# Patient Record
Sex: FEMALE | Race: WHITE | NOT HISPANIC OR LATINO | Employment: FULL TIME | URBAN - METROPOLITAN AREA
[De-identification: names, ages, dates, MRNs, and addresses within clinical notes are randomized per-mention and may not be internally consistent; named-entity substitution may affect disease eponyms.]

---

## 2017-05-02 ENCOUNTER — TRANSFERRED RECORDS (OUTPATIENT)
Dept: HEALTH INFORMATION MANAGEMENT | Facility: CLINIC | Age: 57
End: 2017-05-02

## 2017-06-09 ENCOUNTER — TRANSFERRED RECORDS (OUTPATIENT)
Dept: HEALTH INFORMATION MANAGEMENT | Facility: CLINIC | Age: 57
End: 2017-06-09

## 2017-08-17 ENCOUNTER — TRANSFERRED RECORDS (OUTPATIENT)
Dept: HEALTH INFORMATION MANAGEMENT | Facility: CLINIC | Age: 57
End: 2017-08-17

## 2017-12-27 ENCOUNTER — TRANSFERRED RECORDS (OUTPATIENT)
Dept: HEALTH INFORMATION MANAGEMENT | Facility: CLINIC | Age: 57
End: 2017-12-27

## 2018-06-29 ENCOUNTER — TRANSFERRED RECORDS (OUTPATIENT)
Dept: HEALTH INFORMATION MANAGEMENT | Facility: CLINIC | Age: 58
End: 2018-06-29

## 2018-07-10 ENCOUNTER — TRANSFERRED RECORDS (OUTPATIENT)
Dept: HEALTH INFORMATION MANAGEMENT | Facility: CLINIC | Age: 58
End: 2018-07-10

## 2018-07-12 ENCOUNTER — TRANSFERRED RECORDS (OUTPATIENT)
Dept: HEALTH INFORMATION MANAGEMENT | Facility: CLINIC | Age: 58
End: 2018-07-12

## 2018-09-10 ENCOUNTER — TRANSFERRED RECORDS (OUTPATIENT)
Dept: HEALTH INFORMATION MANAGEMENT | Facility: CLINIC | Age: 58
End: 2018-09-10

## 2018-10-22 ENCOUNTER — TRANSFERRED RECORDS (OUTPATIENT)
Dept: HEALTH INFORMATION MANAGEMENT | Facility: CLINIC | Age: 58
End: 2018-10-22

## 2019-08-20 ENCOUNTER — TELEPHONE (OUTPATIENT)
Dept: OPHTHALMOLOGY | Facility: CLINIC | Age: 59
End: 2019-08-20

## 2019-08-22 ENCOUNTER — TRANSFERRED RECORDS (OUTPATIENT)
Dept: HEALTH INFORMATION MANAGEMENT | Facility: CLINIC | Age: 59
End: 2019-08-22

## 2019-09-10 ENCOUNTER — TELEPHONE (OUTPATIENT)
Dept: OPHTHALMOLOGY | Facility: CLINIC | Age: 59
End: 2019-09-10

## 2019-09-10 NOTE — TELEPHONE ENCOUNTER
Called and LM on voicemail regarding bumped 9/17 appt with Candelario.   Gave Glaucoma desk call back information to reschedule.  Gloria Blair COA 11:09 AM September 10, 2019

## 2019-09-18 ENCOUNTER — OFFICE VISIT (OUTPATIENT)
Dept: FAMILY MEDICINE | Facility: CLINIC | Age: 59
End: 2019-09-18
Payer: COMMERCIAL

## 2019-09-18 VITALS
RESPIRATION RATE: 16 BRPM | WEIGHT: 152.1 LBS | DIASTOLIC BLOOD PRESSURE: 78 MMHG | HEART RATE: 79 BPM | OXYGEN SATURATION: 99 % | SYSTOLIC BLOOD PRESSURE: 118 MMHG

## 2019-09-18 DIAGNOSIS — Z12.31 VISIT FOR SCREENING MAMMOGRAM: ICD-10-CM

## 2019-09-18 DIAGNOSIS — Z78.0 POSTMENOPAUSAL STATUS: ICD-10-CM

## 2019-09-18 DIAGNOSIS — M85.859 OSTEOPENIA OF NECK OF FEMUR, UNSPECIFIED LATERALITY: ICD-10-CM

## 2019-09-18 DIAGNOSIS — Z83.79 FAMILY HISTORY OF BARRETT'S ESOPHAGUS: ICD-10-CM

## 2019-09-18 DIAGNOSIS — Z00.00 ROUTINE HISTORY AND PHYSICAL EXAMINATION OF ADULT: ICD-10-CM

## 2019-09-18 DIAGNOSIS — Z00.00 ENCOUNTER FOR MEDICAL EXAMINATION TO ESTABLISH CARE: Primary | ICD-10-CM

## 2019-09-18 DIAGNOSIS — Z80.51 FAMILY HISTORY OF RENAL CANCER: ICD-10-CM

## 2019-09-18 DIAGNOSIS — N89.8 VAGINAL DRYNESS: ICD-10-CM

## 2019-09-18 LAB
ALBUMIN UR-MCNC: NEGATIVE MG/DL
AMORPH CRY #/AREA URNS HPF: ABNORMAL /HPF
APPEARANCE UR: ABNORMAL
BACTERIA #/AREA URNS HPF: ABNORMAL /HPF
BILIRUB UR QL STRIP: NEGATIVE
COLOR UR AUTO: YELLOW
GLUCOSE UR STRIP-MCNC: NEGATIVE MG/DL
HGB UR QL STRIP: NEGATIVE
KETONES UR STRIP-MCNC: NEGATIVE MG/DL
LEUKOCYTE ESTERASE UR QL STRIP: NEGATIVE
NITRATE UR QL: NEGATIVE
PH UR STRIP: 7 PH (ref 5–7)
RBC #/AREA URNS AUTO: 1 /HPF (ref 0–2)
SOURCE: ABNORMAL
SP GR UR STRIP: 1.01 (ref 1–1.03)
UROBILINOGEN UR STRIP-MCNC: 0 MG/DL (ref 0–2)
WBC #/AREA URNS AUTO: 0 /HPF (ref 0–5)

## 2019-09-18 RX ORDER — MULTIPLE VITAMINS W/ MINERALS TAB 9MG-400MCG
1 TAB ORAL DAILY
COMMUNITY

## 2019-09-18 SDOH — HEALTH STABILITY: MENTAL HEALTH: HOW MANY STANDARD DRINKS CONTAINING ALCOHOL DO YOU HAVE ON A TYPICAL DAY?: 3 OR 4

## 2019-09-18 ASSESSMENT — PAIN SCALES - GENERAL: PAINLEVEL: NO PAIN (0)

## 2019-09-18 NOTE — PROGRESS NOTES
[facility]  Primary Care Center   Jorge Mercado MD  09/18/2019      Chief Complaint:   No chief complaint on file.       History of Present Illness:   Angelita Valentine is a 59 year old female with a history of a sebaceous cyst and lipoma of skin who presents for {eval/fu:951535} of ***.    Other concerns discussed:  ***     Review of Systems:   Pertinent items are noted in HPI or as in patient entered ROS below, remainder of complete ROS is negative.     Active Medications:     Current Outpatient Medications:      ASPIRIN ADULT LOW STRENGTH PO, , Disp: , Rfl:      cetirizine (ZYRTEC) 10 MG tablet, Take 10 mg by mouth daily, Disp: , Rfl:      desonide (DESOWEN) 0.05 % cream, Apply topically 2 times daily, Disp: , Rfl:       Allergies:   Penicillins and Nsaids      Past Medical History:  Lipoma of skin and subcutaneous tissue  Sebaceous cyst  Past Medical History:   Diagnosis Date     Lipoma of other skin and subcutaneous tissue      Sebaceous cyst      There is no problem list on file for this patient.       Past Surgical History:  Appendectomy  Breast biopsy  Excision nevi of neck and abdomen  Removal and shave biopsy of cyst on back  Colonoscopy  GYN surgery   Past Surgical History:   Procedure Laterality Date     APPENDECTOMY       BREAST SURGERY  biopsy     C NONSPECIFIC PROCEDURE  08-    excision nevi of neck & abdomen     C NONSPECIFIC PROCEDURE  06-    removal & shave biopsy of cyst on back      COLONOSCOPY N/A 4/12/2016    Procedure: COLONOSCOPY;  Surgeon: Gee Connelly MD;  Location:  GI     GYN SURGERY  ovarian cyst       Family History:   No family history on file.      Social History:  This patient was accompanied by: ***  Smoking status: never  Smokeless tobacco: never  Alcohol use: yes, couple times a week  Drug use: ***  Social History     Tobacco Use     Smoking status: Never Smoker   Substance Use Topics     Alcohol use: Yes     Comment: couple a week     Drug use: Not on  file        Physical Exam:   There were no vitals taken for this visit.   BMI= There is no height or weight on file to calculate BMI.    GENERAL APPEARANCE: healthy, alert and no distress  EYES: Eyes grossly normal to inspection, PERRL and conjunctivae and sclerae normal  HENT: ear canals and TM's normal, nose and mouth without ulcers or lesions, oropharynx clear and oral mucous membranes moist  NECK: no adenopathy, no asymmetry, masses, or scars and thyroid normal to palpation  RESP: lungs clear to auscultation - no rales, rhonchi or wheezes  CV: regular rate and rhythm, normal S1 S2, no S3 or S4, no murmur, click or rub, no peripheral edema and peripheral pulses strong  ABDOMEN: soft, nontender, no hepatosplenomegaly, no masses and bowel sounds normal  MS: no musculoskeletal defects are noted and gait is age appropriate without ataxia  SKIN: no suspicious lesions or rashes  NEURO: Normal strength and tone, sensory exam grossly normal, mentation intact and speech normal  PSYCH: mentation appears normal and affect normal/bright      Assessment and Plan:  Angelita Valentine is a 59 year old female with a history of a sebaceous cyst and lipoma of skin who presents for  There are no diagnoses linked to this encounter.     Follow-up: No follow-ups on file.         Scribe Disclosure:  IRosa, am serving as a scribe to document services personally performed by Jorge Mercado MD at this visit, based upon the provider's statements to me. All documentation has been reviewed by the aforementioned provider prior to being entered into the official medical record.    Scribe Preparation Attestation:  Rosa KATZ, a scribe, prepared the chart for today's encounter. ***     Portions of this medical record were completed by a scribe. UPON MY REVIEW AND AUTHENTICATION BY ELECTRONIC SIGNATURE, this confirms (a) I performed the applicable clinical services, and (b) the record is accurate.

## 2019-09-18 NOTE — PATIENT INSTRUCTIONS
Primary Care Center Medication Refill Request Information:  * Please contact your pharmacy regarding ANY request for medication refills.  ** [facility] Prescription Fax = [phone]  * Please allow 3 business days for routine medication refills.  * Please allow 5 business days for controlled substance medication refills.     Primary Care Center Test Result notification information:  *You will be notified with in 7-10 days of your appointment day regarding the results of your test.  If you are on MyChart you will be notified as soon as the provider has reviewed the results and signed off on them.

## 2019-09-18 NOTE — PROGRESS NOTES
[facility]  Primary Care Center   Jorge Mercado MD  2019      Chief Complaint:   Establish Care   Physical    History of Present Illness:   Angelita Valentine is a 59 year old female with a history of a sebaceous cyst and lipoma of skin who presents to establish care as her health coverage has recently changed and for a physical.  She has a family Histroy of several cancers and  Recently learned diagnosis of draper's esophagus, esophageal cancer in her father who .    Family history:   Esophageal cancer: Her father recently passed away of esophageal cancer along with her paternal aunt. He had an endoscopy done that showed Draper's esophagus as his went undiagnosed for years, however the doctor recommended that all of the children have an evaluation for draper's esophagus as her father had esophageal cancer and did not have reflux symptoms. Her brother had an endoscopy that did show reflux, however he did not show any signs of Draper's esophagus. Currently, she denies any symptoms of reflux.     Melanoma: She has a family history of Melanoma in her brother and father. She receives regular skin checks, however plans to look into see if she needs to change her dermatologist as well after her insurance change.    Vaginal Dryness:   Since menopause she has had vaginal dryness that is bothersome during intercourse. She still has her uterus and ovaries. Previously, she used an E-string ring to help and requires a refill. She has normal  PAP and HPV screening next due in . At a younger age she had endometriosis and some cysts removed.  She denies vaginal bleeding.    Tennis elbow:   She is experiencing Tennis elbow that she has just been icing. The last time she had this was was many years ago, and is wondering if she should be doing anything to help.     Bone density:    She describes that she takes a chocolate calcium supplement and tries to drink milk with her tea. Due for DEXA  ast last was competed  6/2017  previous had low bone density.    Health Care Maintenance:   Mammogram (females age 40 - 75): yearly or every 2 years - Up to date Last done on 7/12/2018  Pap (females age 21 - 65): every 3 years, every 5 years after age 35 if cotesting done - Up to date 12/13/2016 - normal NIL, HPV negative - Due 12/2021   Colon Cancer screening (age 50 - 75): yearly FIT or colonoscopy every 5 - 10 years - Up to date - Last done on 4/12/2016 and every 10 years recommended.   Dexa (females age ? 65, males age ? 70): every 2 years - Up to date - Last done in 6/2017 showed low done density  Lipid panel: every 5 years, yearly if risk factors - Up to date - 6/29/2018   Hepatitis C (adults born 1947 - 1965): once per lifetime - Up to date - 7/23/2015  Immunizations (Tetanus every 10 years, Influenza yearly, Pneumonia PPV-23 and PCV-13 age ? 65 or sooner if risk factors) - Recommended - Plans to obtain Shingles vaccine from [facility]  Immunization History   Administered Date(s) Administered     HEPA 03/15/2001, 04/08/2002     HepA-Adult 03/15/2001, 04/08/2002     Influenza (IIV3) PF 12/24/2012, 10/16/2013, 10/07/2014     Influenza (intradermal) 10/28/2015     Influenza Vaccine IM > 6 months Valent IIV4 10/01/2016     Influenza Vaccine, 3 YRS +, IM (QUADRIVALENT W/PRESERVATIVES) 10/28/2015     Poliovirus, inactivated (IPV) 03/15/2001     TDAP Vaccine (Adacel) 01/01/2008, 07/27/2018     Typhoid IM 03/15/2001     Typhus 03/15/2001      The following health maintenance issues were also reviewed and addressed as needed:  Lifestyle habits (including exercise, diet, weight)  Health risk behaviors (sexual history, alcohol, tobacco, drug use, partner violence)  Routine eye, dental, hearing, and skin exams  Advanced directives    Review of Systems:   Pertinent items are noted in HPI or as in patient entered ROS below, remainder of complete ROS is negative.     Active Medications:      cetirizine (ZYRTEC) 10 MG tablet, Take 10 mg by mouth  daily, Disp: , Rfl:      multivitamin w/minerals (MULTI-VITAMIN) tablet, Take 1 tablet by mouth daily, Disp: , Rfl:      desonide (DESOWEN) 0.05 % cream, Apply topically 2 times daily, Disp: , Rfl:       Allergies:   Penicillins; Acetaminophen; Ibuprofen; Latex; Naproxen; and Nsaids      Past Medical History:  Lipoma of skin and subcutaneous tissue  Sebaceous cyst  Seasonal allergies     Past Surgical History:  Appendectomy  Breast biopsy  Excision nevi of neck and abdomen  Removal and shave biopsy of cyst on back  Colonoscopy  GYN surgery     Family History:    Esophageal cancer - father (passed at 90 years old), paternal aunt   Congestive heart failure - mother (passed at 82 years old)  Gastroesophageal reflux - brother  Melanoma - brother, father   Kidney cancer - brother  Pancreatic cancer - paternal grandmother (passed at 57 years old)  Breast cancer - maternal aunt  Type 2 diabetes - father    Social History:  This patient presented alone.   Smoking status: not regularly   Smokeless tobacco: never  Alcohol use: yes, 3-4 times a week, wine  Drug use: no     Social History     Social History Narrative    Retired   to [employer] retired  1996 no children winter in [place]     Physical Exam:   /78 (BP Location: Right arm, Patient Position: Sitting, Cuff Size: Adult Regular)   Pulse 79   Resp 16   Wt 69 kg (152 lb 1.6 oz)   SpO2 99%   Breastfeeding? No    BMI= There is no height or weight on file to calculate BMI.    GENERAL APPEARANCE: healthy, alert and no distress  EYES: Eyes grossly normal to inspection, PERRL and conjunctivae and sclerae normal  HENT: ear canals and TM's normal, bilateral ears with cerumen, nose and mouth without ulcers or lesions, oropharynx clear and oral mucous membranes moist  NECK: no adenopathy, no asymmetry, masses, or scars and thyroid normal to palpation  RESP: lungs clear to auscultation - no rales, rhonchi or wheezes  CV: regular rate and  rhythm, normal S1 S2, no S3 or S4, no murmur, click or rub, no peripheral edema and peripheral pulses strong including dorsalis pedis and tibial  ABDOMEN: soft, nontender, no hepatosplenomegaly, no masses and bowel sounds normal  MS: no musculoskeletal defects are noted and gait is age appropriate without ataxia  SKIN: On back there are a few scattered nevi and seborrheic keratosis, none of which are concerning. On abdomen under right and left breast there is a few scattered seborrheic keratosis. Scar on umbilicus from laparoscopic procedure.   NEURO: Normal strength and tone, sensory exam grossly normal, mentation intact and speech normal  PSYCH: mentation appears normal and affect normal/bright   BREAST: normal without masses, tenderness or nipple discharge and no palpable axillary masses or adenopathy    Assessment and Plan:  Angelita Valentine is a 59 year old female with a history of a sebaceous cyst and lipoma of skin who presents to establish care and for a physical.   Family history of Sherwood's esophagus and several other cancers including melanoma, pancreatic cancers.  I discussed genetics referral to check with her insurance to determine if covered.She sees dermatology due to family HX of melanoma  Her father and paternal aunt have Sherwood's esophagus and she would like to have an endoscopy to check for this. She is not currently having any reflux symptoms.   - GASTROENTEROLOGY ADULT REF PROCEDURE ONLY  - GENETICS REFERRAL    Osteopenia of neck of femur, unspecified laterality  - Dexa hip/pelvis/spine*    Visit for screening mammogram  Due 7/2020, and a future order was placed.   - Mammogram, screening w av (3D)    Postmenopausal status  Vaginal dryness  We discussed that using an E-string ring may help with vaginal dryness, however there is a risk involved related to endometrial cancer therefore if vaginal bleeding needs to contact MD for further assessment. A prescription was placed, however she will  decide later if she would like to fill this. If she does decide to use this, she should watch for bleeding.  Due for pap in 2021  - estradiol (ESTRING) 2 MG vaginal ring  Dispense: 1 each; Refill: 4    Family history of renal cancer  Given that she has a family history of esophageal, pancreatic, kidney, and breast cancer she will consider having genetic consult to determine if genetic  testing  Should be completed.  - UA with Micro reflex to Culture  - GENETICS REFERRAL  My card provided.  She will try to get 1000 mg of calcium best through nutrition.  Follow-up: Return in about 1 year (around 9/18/2020).         Scribe Disclosure:  I, Rosa Park, am serving as a scribe to document services personally performed by Jorge Mercado MD at this visit, based upon the provider's statements to me. All documentation has been reviewed by the aforementioned provider prior to being entered into the official medical record.     Portions of this medical record were completed by a scribe. UPON MY REVIEW AND AUTHENTICATION BY ELECTRONIC SIGNATURE, this confirms (a) I performed the applicable clinical services, and (b) the record is accurate.   Jorge Mercado MD

## 2019-09-23 ENCOUNTER — ANCILLARY PROCEDURE (OUTPATIENT)
Dept: BONE DENSITY | Facility: CLINIC | Age: 59
End: 2019-09-23
Attending: FAMILY MEDICINE
Payer: COMMERCIAL

## 2019-09-23 DIAGNOSIS — M85.859 OSTEOPENIA OF NECK OF FEMUR, UNSPECIFIED LATERALITY: ICD-10-CM

## 2019-09-24 ENCOUNTER — OFFICE VISIT (OUTPATIENT)
Dept: OPHTHALMOLOGY | Facility: CLINIC | Age: 59
End: 2019-09-24
Attending: OPHTHALMOLOGY
Payer: COMMERCIAL

## 2019-09-24 DIAGNOSIS — H40.003 GLAUCOMA SUSPECT OF BOTH EYES: Primary | ICD-10-CM

## 2019-09-24 DIAGNOSIS — H40.9 GLAUCOMA: ICD-10-CM

## 2019-09-24 DIAGNOSIS — H25.13 NUCLEAR SENILE CATARACT OF BOTH EYES: ICD-10-CM

## 2019-09-24 PROCEDURE — 76514 ECHO EXAM OF EYE THICKNESS: CPT | Mod: ZF | Performed by: OPHTHALMOLOGY

## 2019-09-24 PROCEDURE — 92133 CPTRZD OPH DX IMG PST SGM ON: CPT | Mod: ZF | Performed by: OPHTHALMOLOGY

## 2019-09-24 PROCEDURE — 92020 GONIOSCOPY: CPT | Mod: ZF | Performed by: OPHTHALMOLOGY

## 2019-09-24 PROCEDURE — G0463 HOSPITAL OUTPT CLINIC VISIT: HCPCS | Mod: ZF

## 2019-09-24 PROCEDURE — 92083 EXTENDED VISUAL FIELD XM: CPT | Mod: ZF | Performed by: OPHTHALMOLOGY

## 2019-09-24 ASSESSMENT — VISUAL ACUITY
OD_CC+: -2
OD_CC: 20/30
OD_PH_CC: 20/25
OS_CC+: -3
METHOD: SNELLEN - LINEAR
CORRECTION_TYPE: GLASSES
OS_CC: 20/20

## 2019-09-24 ASSESSMENT — GONIOSCOPY
OS_NASAL: 3-4
OD_INFERIOR: 3-4
OS_INFERIOR: 3-4
OD_SUPERIOR: 3-4
ADDITIONAL_COMMENTS: 2-3+ TMP OU
OD_NASAL: 3-4
OS_SUPERIOR: 3-4
OS_TEMPORAL: 3-4
OD_TEMPORAL: 3-4

## 2019-09-24 ASSESSMENT — PACHYMETRY
OD_CT(UM): 536
OS_CT(UM): 533

## 2019-09-24 ASSESSMENT — REFRACTION_WEARINGRX
OS_CYLINDER: +4.00
OD_SPHERE: -9.50
OS_AXIS: 089
OD_CYLINDER: +3.75
OD_AXIS: 109
OS_ADD: +2.50
OD_ADD: +2.50
OS_SPHERE: -10.75
SPECS_TYPE: PAL

## 2019-09-24 ASSESSMENT — SLIT LAMP EXAM - LIDS
COMMENTS: NORMAL
COMMENTS: NORMAL

## 2019-09-24 ASSESSMENT — CUP TO DISC RATIO
OD_RATIO: 0.4
OS_RATIO: 0.5

## 2019-09-24 ASSESSMENT — TONOMETRY
IOP_METHOD: APPLANATION
OS_IOP_MMHG: 15
OD_IOP_MMHG: 15

## 2019-09-24 ASSESSMENT — CONF VISUAL FIELD
OS_NORMAL: 1
METHOD: COUNTING FINGERS
OD_NORMAL: 1

## 2019-09-24 NOTE — PROGRESS NOTES
1)Glc Suspect -- K pachy:  536/533  Tmax:     HVF: Fluct      CDR:0.4/0.5     HRT/OCT: Mild RNFl thinning       FHX of Glc: No     Gonio: open      Intolerant to:      Asthma/COPD: No  Steroid Use: No     Kidney Stones: No    Sulfa Allergy: No     IOP targets:  2)NS OU -- following with Dr. Reid  3)High Myopia -- retired    4)H/O RT OS per patient   5)CAROLE -- travels to [place] in winter     Will obtain old notes and visual field tests.  Patient will return to clinic in 8-12 months with visual field test, dilated eye exam, and disc photos.  If stable will consider following IOPs and ONH.       Attending Physician Attestation:  Complete documentation of historical and exam elements from today's encounter can be found in the full encounter summary report (not reduplicated in this progress note). I personally obtained the chief complaint(s) and history of present illness.  I confirmed and edited as necessary the review of systems, past medical/surgical history, family history, social history, and examination findings as documented by others; and I examined the patient myself. I personally reviewed the relevant tests, images, and reports as documented above. I formulated and edited as necessary the assessment and plan and discussed the findings and management plan with the patient and family.  - Tory Palomino MD

## 2019-09-24 NOTE — PATIENT INSTRUCTIONS
Patient will return to clinic in 8-12 months with visual field test, dilated eye exam, and disc photos.     Patient will start warm compresses 2x/day, increase dietary flax seed/fish oil dietary supplementation, and start artificial tears 4-6x/day as needed for burning, tearing, mattering, foreign body sensation, blurred vision, etc.  Artifical tear drops are available over the counter. Below are a list of the some of the drops available:  Refresh   Systane  Theratears  Genteal  Blink  Optive      Please avoid Visine (unless Visine Puretears), Murine or Cleareyes or Puralube tear drops.    These have ingredients that take the red out and but actually increase dryness and redness of the eyes over time

## 2019-09-24 NOTE — NURSING NOTE
Chief Complaints and History of Present Illnesses   Patient presents with     Glaucoma     Chief Complaint(s) and History of Present Illness(es)     Glaucoma     Laterality: both eyes    Quality: difficult to focus and blurred    Treatment side effects: none              Comments     Pt notes her vision is blurry BE and has trouble seeing street signs, along with poor night vision.   Complains of occasional glare at night time and tearing BE.  Denies any flashes, floaters, pain, pressure, irritation, and discharge.    Kate Cabrera OT 12:49 PM September 24, 2019

## 2019-09-26 ENCOUNTER — MYC MEDICAL ADVICE (OUTPATIENT)
Dept: FAMILY MEDICINE | Facility: CLINIC | Age: 59
End: 2019-09-26

## 2019-09-26 DIAGNOSIS — Z78.0 POSTMENOPAUSAL STATUS: Primary | ICD-10-CM

## 2019-09-27 ENCOUNTER — TELEPHONE (OUTPATIENT)
Dept: FAMILY MEDICINE | Facility: CLINIC | Age: 59
End: 2019-09-27

## 2019-09-27 NOTE — TELEPHONE ENCOUNTER
Prior Authorization Retail Medication Request    Medication/Dose: Estring 2 mg Vaginal Ring  ICD code (if different than what is on RX):  Z78.0  Previously Tried and Failed:  Unknown  Rationale:  None    Insurance Name:  [facility]  Insurance ID:  [id number]

## 2019-09-28 ENCOUNTER — HEALTH MAINTENANCE LETTER (OUTPATIENT)
Age: 59
End: 2019-09-28

## 2019-09-30 ENCOUNTER — MYC MEDICAL ADVICE (OUTPATIENT)
Dept: FAMILY MEDICINE | Facility: CLINIC | Age: 59
End: 2019-09-30

## 2019-09-30 NOTE — TELEPHONE ENCOUNTER
Please call her, need to know if she would like alternative RX. I tried to call not able to leave a message on denial of insurance to cover Estring.  Jorge Mercado MD

## 2019-09-30 NOTE — TELEPHONE ENCOUNTER
September 30, 2019  Please check on the status of prior authorization for estring.  Best wishes,  Jorge Mercado MD

## 2019-09-30 NOTE — TELEPHONE ENCOUNTER
PRIOR AUTHORIZATION DENIED    Medication: Estring 2 mg Vaginal Ring-PA DENIED     Denial Date: 9/30/2019    Denial Rational: Criteria Not Met. Insurance states that patient must tried/failed 2 formulary alternatives: Estradiol vaginal tablets, and Estradiol Patches.         Appeal Information: If provider would like to appeal please provide a letter of medical necessity stating why formulary alternatives would not be clinically appropriate for patient and route back to the PA team.

## 2019-09-30 NOTE — TELEPHONE ENCOUNTER
Central Prior Authorization Team   [phone]    PA Initiation    Medication: Estring 2 mg Vaginal Ring  Insurance Company: [employer] - Phone [phone] Fax [phone]  Pharmacy Filling the Rx: [id number] [address] [facility] [address]  Filling Pharmacy Phone: [phone]  Filling Pharmacy Fax: [phone]  Start Date: 9/30/2019

## 2019-10-01 NOTE — TELEPHONE ENCOUNTER
I can be reached at [phone] until noon on Tuesday. Thanks.   Angelita     October 1, 2019 8:55, 10:20 10:45 am I tried to call not able to leave a message on voicemail.   She will require an appointment to discuss further.  Jorge Mercado MD

## 2019-10-01 NOTE — TELEPHONE ENCOUNTER
Called the pt, she wanted to know more about the risk/benifits and side effects of the other medication she has to try before approval.  She said Wednesday would be a good time, routed to MD Esperanza Acosta Paramedic on 10/1/2019 at 8:47 AM

## 2019-10-01 NOTE — TELEPHONE ENCOUNTER
I tried to call her during the time frame she was available today and not able to connect with her or leave a message therefore she needs a follow-up appt to discuss medications.  Jorge Mercado MD

## 2019-10-01 NOTE — TELEPHONE ENCOUNTER
Sent a message to schedule an apt to discuss. Esperanza Acosta Paramedic on 10/1/2019 at 12:34 PM

## 2019-12-02 ENCOUNTER — MYC MEDICAL ADVICE (OUTPATIENT)
Dept: FAMILY MEDICINE | Facility: CLINIC | Age: 59
End: 2019-12-02

## 2019-12-02 NOTE — TELEPHONE ENCOUNTER
Asked where Gee Connelly worked so I can fax the referral . Esperanza Acsota Paramedic on 12/2/2019 at 4:06 PM     Faxed endoscopy to [facility]. Esperanza Acosta Paramedic on 12/2/2019 at 4:12 PM

## 2019-12-03 ENCOUNTER — MYC MEDICAL ADVICE (OUTPATIENT)
Dept: FAMILY MEDICINE | Facility: CLINIC | Age: 59
End: 2019-12-03

## 2020-01-02 ENCOUNTER — OFFICE VISIT (OUTPATIENT)
Dept: FAMILY MEDICINE | Facility: CLINIC | Age: 60
End: 2020-01-02
Payer: COMMERCIAL

## 2020-01-02 VITALS
DIASTOLIC BLOOD PRESSURE: 79 MMHG | HEIGHT: 68 IN | SYSTOLIC BLOOD PRESSURE: 119 MMHG | OXYGEN SATURATION: 98 % | BODY MASS INDEX: 23.57 KG/M2 | WEIGHT: 155.5 LBS | TEMPERATURE: 97.5 F | RESPIRATION RATE: 16 BRPM | HEART RATE: 67 BPM

## 2020-01-02 DIAGNOSIS — M77.12 LEFT TENNIS ELBOW: ICD-10-CM

## 2020-01-02 DIAGNOSIS — Z78.0 POSTMENOPAUSAL STATUS: ICD-10-CM

## 2020-01-02 DIAGNOSIS — Z71.84 TRAVEL ADVICE ENCOUNTER: ICD-10-CM

## 2020-01-02 DIAGNOSIS — N89.8 VAGINAL DRYNESS: Primary | ICD-10-CM

## 2020-01-02 DIAGNOSIS — H61.23 CERUMINOSIS, BILATERAL: ICD-10-CM

## 2020-01-02 DIAGNOSIS — Z23 NEED FOR INFLUENZA VACCINATION: ICD-10-CM

## 2020-01-02 LAB
HBV SURFACE AB SERPL IA-ACNC: 0.61 M[IU]/ML
HBV SURFACE AG SERPL QL IA: NONREACTIVE
MEV IGG SER QL IA: 1.4 AI (ref 0–0.8)
MUV IGG SER QL IA: 2.9 AI (ref 0–0.8)
RUBV IGG SERPL IA-ACNC: 64 IU/ML

## 2020-01-02 ASSESSMENT — PAIN SCALES - GENERAL: PAINLEVEL: NO PAIN (0)

## 2020-01-02 ASSESSMENT — MIFFLIN-ST. JEOR: SCORE: 1318.84

## 2020-01-02 NOTE — NURSING NOTE
Angelita RACHEL Serge comes into clinic today at the request of [facility], Ordering Provider for an immunization/s.    I gave the Flu and Typhoid  immunization/s while the patient was in clinic. They received an informational sheet and I explained the common side effects of the injection. The patient tolerated the procedure well and had no complications while here in clinic.     This service provided today was under the supervising provider of the day Santilli, who was available if needed.    Darrick Gaitan CMA, EMT at 4:04 PM on 2020.    Angelita Valentine      1.  Has the patient received the information for the influenza vaccine? YES    2.  Does the patient have any of the following contraindications?     Allergy to eggs? No     Allergy to a component of the influenza vaccine? No     Serious reaction to previous influenza vaccines? No     Paralyzed by Guillain-Troy syndrome? No     Currently sick today? No         3.  Verified patient name and  prior to injection. Yes  4.  The patient was instructed to wait 15 minutes before leaving the building in the event of an allergic reaction: YES        Vaccination given by Darrick Gaitan, EMT.      Recorded by Darrick Gaitan CMA Ellen RACHEL Valenitne comes into clinic today at the request of [facility], Ordering Provider for an immunization/s.    I gave the Flu and Typhoid immunization/s while the patient was in clinic. They received an informational sheet and I explained the common side effects of the injection. The patient tolerated the procedure well and had no complications while here in clinic.     This service provided today was under the supervising provider of the day Santilli, who was available if needed.    Darrick Gaitan CMA, EMT at 4:04 PM on 2020.    EAR WASH    An ear wash was performed by myself while the patient was in clinic.   The patient tolerated this procedure well and had no complications.    A large amount of impacted cerumen was removed  from the left ear and a small amount of cerumen was removed from the right ear.    Tympanic membrane was clearly visible.     Darrick Gaitan, DOROTHEA at 4:04 PM on 1/2/2020.

## 2020-01-02 NOTE — PATIENT INSTRUCTIONS
[facility] Medication Refill Request Information:  * Please contact your pharmacy regarding ANY request for medication refills.  ** [facility] Prescription Fax = [phone]  * Please allow 3 business days for routine medication refills.  * Please allow 5 business days for controlled substance medication refills.     [facility] Test Result notification information:  *You will be notified with in 7-10 days of your appointment day regarding the results of your test.  If you are on MyChart you will be notified as soon as the provider has reviewed the results and signed off on them.    [facility]: [phone]

## 2020-01-02 NOTE — PROGRESS NOTES
[facility]  Primary Care Center   Jorge Mercado MD  01/02/2020      Chief Complaint:   Recheck Medication  Left elbow pain  Vaginal dryness  Family HX cancer  Travel to [facility]        History of Present Illness:   Angelita Valentine is a 59 year old female with a history of seasonal allergies, osteopenia, and glaucoma suspect who presents for evaluation of elbow pain and follow up of vaginal dryness.    Elbow pain: She has had pain in her left elbow for about 4 months with elbow flexion that prevents her from doing bicep curls or lifting anything heavy. The pain is 4/10 in severity only occurs with motion. She denies limitation of motion. She plays tennis and pickleball and is left handed. The pain occurs intermittently during those activities. She wears a tennis elbow brace when she plays. She is still playing a couple of times per week. She has not tried taking Aspirin or icing.     Vaginal dryness: She has vaginal dryness. She used to use an Estradiol 2 mg vaginal ring, but her insurance no longer pays for it. She is interested in other treatments. She still has her uterus no vaginal bleeding.    Family history:  At last visit we reviewed her family HX of several cancers including melanoma, esophageal , pancreatic cancer, renal cancer and I discussed consideration of genetics referral to determine if genetic testing is recommended. She will consider genetic counseling after she reviews with her siblings for her family history of pancreatic, kidney, and esophageal cancer and melanoma. She had a normal colonoscopy in 2016. She has an endoscopy scheduled that she will complete later this spring as she has a family HX of manuel's esophagus. She has no current GI concerns.  She has yet to complete mammogram ordered last visit but will complete prior to this summer.     Other concerns discussed:  1. She takes calcium supplements daily and exercises to manage osteopenia.   2. She will be traveling to [facility] in March.  "  3. Her ears are\" itchy and crunchy\"  wondering if cerumen.     Review of Systems:   Pertinent items are noted in HPI or as in patient entered ROS below, remainder of complete ROS is negative.     Active Medications:     Current Outpatient Medications:      cetirizine (ZYRTEC) 10 MG tablet, Take 10 mg by mouth daily, Disp: , Rfl:      [START ON 1/3/2020] conjugated estrogens (PREMARIN) 0.625 MG/GM vaginal cream, Place 0.5 g vaginally three times a week Then taper to once or twice weekly as needed, Disp: 30 g, Rfl: 6     desonide (DESOWEN) 0.05 % cream, Apply topically 2 times daily, Disp: , Rfl:      estradiol (ESTRING) 2 MG vaginal ring, Place 1 each vaginally every 3 months, Disp: 1 each, Rfl: 4     multivitamin w/minerals (MULTI-VITAMIN) tablet, Take 1 tablet by mouth daily, Disp: , Rfl:      ASPIRIN ADULT LOW STRENGTH PO, , Disp: , Rfl:       Allergies:   Penicillins; Acetaminophen; Ibuprofen; Latex; Naproxen; and Nsaids      Past Medical History:  Lipoma of other skin and subcutaneous tissue  Seasonal allergies  Sebaceous cyst  Osteopenia of neck of femur  Glaucoma suspect of both eyes  Nuclear senile cataract of both eyes      Past Surgical History:  Appendectomy  Breast biopsy  Excision of nevi of neck and abdomen: 2000  Removal and shave biopsy of back cyst: 2000  Colonoscopy: 4/2016  Gyn surgery for ovarian cyst     Family History:   Esophageal cancer: father, paternal aunt  Sherwood's esophagus: father  Melanoma: father, brother  Type 2 diabetes mellitus: father  Heart failure: mother  GERD: brother  Kidney cancer: brother  Pancreatic cancer: paternal grandmother       Social History:   Smoking status: never smoker  Alcohol use: 4 glasses of wine per week     Physical Exam:   /79 (BP Location: Right arm, Patient Position: Sitting, Cuff Size: Adult Regular)   Pulse 67   Temp 97.5  F (36.4  C) (Oral)   Resp 16   Ht 1.727 m (5' 8\")   Wt 70.5 kg (155 lb 8 oz)   SpO2 98%   BMI 23.64 kg/m    "   GENERAL APPEARANCE: healthy, alert and no distress. Detail oriented.   EYES: Eyes grossly normal to inspection, PERRL and conjunctivae and sclerae normal  HENT: ear canals obstructed by wax and visible TM's normal, mouth without ulcers or lesions, oropharynx clear and oral mucous membranes moist  NECK: no adenopathy, no asymmetry, masses, or scars and thyroid normal to palpation  RESP: lungs clear to auscultation - no rales, rhonchi or wheezes  CV: regular rate and rhythm, normal S1 S2, no S3 or S4, no murmur, click or rub  MS: left lateral and medical epicondylitis with pain with palpation, no swelling or deformity full range of motion of upper extremities, no musculoskeletal defects are noted and gait is age appropriate without ataxia  NEURO: Normal strength and tone, mentation intact and speech normal  PSYCH: mentation appears normal and affect normal/bright, detail-oriented     Assessment and Plan:  Angelita Valentine is a 59 year old female with a history of seasonal allergies, osteopenia, and glaucoma suspect who presents for left evaluation of elbow pain and follow up of vaginal dryness, family HX of cancer ( she will contact me if she would like genetic referral) and travel recommendations.    Vaginal dryness, Postmenopausal status  She used to use Estradiol ring for vaginal dryness, but her insurance no longer covers it. Gave Premarin cream as an alternative. Discussed that she needs to contact if she develops vaginal bleeding, reviewed risk for endometrial cancer. She can also use lubricants during intercourse. If the vaginal dryness and other symptoms such as hot flashes are interfering with her quality of life, can consider Estrogen pills along with Progesterone. Discussed the risks of cancer involved.   - conjugated estrogens (PREMARIN) 0.625 MG/GM vaginal cream  Dispense: 30 g; Refill: 6  Start Three times weekly reduce to once or twice weekly if able for symptom control  Need for influenza vaccination  -  HC FLU VAC PRESRV FREE QUAD SPLIT VIR > 6 MONTHS IM    Travel advice encounter  I reviewed CDC for travel and reviewed information with her.  She is leaving today to spend the rest of the winter in [place], and will be traveling to [facility] in March. She is due for a typhoid vaccine. Will check labs for immunity to measles and hepatitis B. If she is not immune, she can have her vaccinations done in [place].   - TYPHOID VACCINE, IM  - Hepatitis B Surface Antibody  - Hepatitis B surface antigen  - Rubeola Antibody IgG  - Rubella Antibody IgG Quantitative  - Mumps Immune Status, IgG    Left tennis elbow  She will try taking Aspirin right before exercise with food to help prevent elbow pain and continue to use the tennis elbow brace. She can use ice if it feels inflamed after exercise. Recommended assessing the ergonomics of her swing. If it continues to hurt, she will reduce activity.     Ceruminosis, bilateral  Her ear canals are obstructed by wax today. Removed in clinic today.   - REMOVE IMPACTED CERUMEN    She will complete mammogram and Upper endoscopy as soon as she returns from travel.  Follow-up: Annual visit       Scribe Disclosure:  Sharon KATZ, am serving as a scribe to document services personally performed by Jorge Mercado MD at this visit, based upon the provider's statements to me. All documentation has been reviewed by the aforementioned provider prior to being entered into the official medical record.    Scribe Preparation Attestation:  Sharon KATZ, a scribe, prepared the chart for today's encounter.      Portions of this medical record were completed by a scribe. UPON MY REVIEW AND AUTHENTICATION BY ELECTRONIC SIGNATURE, this confirms (a) I performed the applicable clinical services, and (b) the record is accurate.   Jorge Mercado MD

## 2020-01-02 NOTE — NURSING NOTE
Chief Complaint   Patient presents with     Recheck Medication     Pt comes in for a routine follow up. Would like to discuss vaccinations for upcoming travel.         Alfredo Lin, EMT on 1/2/2020 at 8:03 AM

## 2020-01-03 ENCOUNTER — TELEPHONE (OUTPATIENT)
Dept: INTERNAL MEDICINE | Facility: CLINIC | Age: 60
End: 2020-01-03

## 2020-01-03 DIAGNOSIS — Z78.0 POSTMENOPAUSAL STATUS: ICD-10-CM

## 2020-01-03 DIAGNOSIS — N89.8 VAGINAL DRYNESS: Primary | ICD-10-CM

## 2020-01-03 RX ORDER — ESTRADIOL 0.1 MG/G
2 CREAM VAGINAL
Qty: 42.5 G | Refills: 6 | Status: SHIPPED | OUTPATIENT
Start: 2020-01-03 | End: 2020-01-22

## 2020-01-03 NOTE — TELEPHONE ENCOUNTER
Angelita chart reviewed today for refill request. Premarin not covered.    Diagnoses and all orders for this visit:    Vaginal dryness  -     estradiol (ESTRACE) 0.1 MG/GM vaginal cream; Place 2 g vaginally three times a week Then may lower to once or twice weekly as needed    Postmenopausal status  -     estradiol (ESTRACE) 0.1 MG/GM vaginal cream; Place 2 g vaginally three times a week Then may lower to once or twice weekly as needed    Jorge Mercado MD

## 2020-01-22 ENCOUNTER — MYC MEDICAL ADVICE (OUTPATIENT)
Dept: FAMILY MEDICINE | Facility: CLINIC | Age: 60
End: 2020-01-22

## 2020-01-22 DIAGNOSIS — Z78.0 POSTMENOPAUSAL STATUS: ICD-10-CM

## 2020-01-22 DIAGNOSIS — N89.8 VAGINAL DRYNESS: ICD-10-CM

## 2020-01-22 RX ORDER — ESTRADIOL 0.1 MG/G
CREAM VAGINAL
Qty: 42.5 G | Refills: 6 | Status: SHIPPED | OUTPATIENT
Start: 2020-01-22 | End: 2020-06-29

## 2020-01-22 NOTE — TELEPHONE ENCOUNTER
Please call her pharmacy [phone]  to determine if estradiol vaginal cream or vagifem inserts are covered by her insurance.   Estradial covered with 50$ copay I was able to reach her reviewed side effects with patient including endometrial cancer risk should notify provider if vaginal bleeding.  Best wishes,  Jorge Mercado MD

## 2020-05-28 ENCOUNTER — OFFICE VISIT (OUTPATIENT)
Dept: OPHTHALMOLOGY | Facility: CLINIC | Age: 60
End: 2020-05-28
Payer: COMMERCIAL

## 2020-05-28 DIAGNOSIS — H52.13 MYOPIA OF BOTH EYES: Primary | ICD-10-CM

## 2020-05-28 DIAGNOSIS — Z86.69 HISTORY OF SUBCONJUNCTIVAL HEMORRHAGE: ICD-10-CM

## 2020-05-28 DIAGNOSIS — H40.003 GLAUCOMA SUSPECT OF BOTH EYES: ICD-10-CM

## 2020-05-28 ASSESSMENT — CUP TO DISC RATIO
OD_RATIO: 0.5
OS_RATIO: 0.6

## 2020-05-28 ASSESSMENT — REFRACTION_WEARINGRX
SPECS_TYPE: PAL
OS_ADD: +2.50
OS_CYLINDER: +4.00
OD_CYLINDER: +3.75
OD_ADD: +2.50
OS_SPHERE: -10.75
OD_SPHERE: -9.50
OD_AXIS: 109
OS_AXIS: 089

## 2020-05-28 ASSESSMENT — REFRACTION_MANIFEST
OD_ADD: +2.25
OD_AXIS: 103
OS_CYLINDER: +4.00
OS_AXIS: 079
OS_SPHERE: -10.00
OD_CYLINDER: +3.50
OD_SPHERE: -8.00
OS_ADD: +2.25

## 2020-05-28 ASSESSMENT — TONOMETRY
OS_IOP_MMHG: 15
OD_IOP_MMHG: 16
IOP_METHOD: ICARE

## 2020-05-28 ASSESSMENT — CONF VISUAL FIELD
OD_NORMAL: 1
OS_NORMAL: 1

## 2020-05-28 ASSESSMENT — VISUAL ACUITY
CORRECTION_TYPE: GLASSES
OS_CC+: -2
OD_CC: 20/30
METHOD: SNELLEN - LINEAR
OD_CC+: -2
OS_CC: 20/20

## 2020-05-28 ASSESSMENT — SLIT LAMP EXAM - LIDS
COMMENTS: NORMAL
COMMENTS: NORMAL

## 2020-05-28 NOTE — PROGRESS NOTES
History  HPI     Blurred Vision Evaluation     In both eyes.  Associated symptoms include Negative for flashes, floaters and redness.  Treatments tried include glasses.  Response to treatment was significant improvement.  Pain was noted as 0/10.              Comments     The patient scratched her glasses several months ago and needs a new glasses prescription. Is wearing her old glasses and struggling with near vision. Follows with Dr. Palomino as a glaucoma suspect. No other concerns. No flashes, floaters, eye pain, no eye redness. Reports burst blood vessel on the white of the eye a month ago, happens less than once per year.          Last edited by Chucky Nalis, OD on 5/28/2020  8:46 AM. (History)          Assessment/Plan  (H52.13) Myopia of both eyes  (primary encounter diagnosis)  Comment: Myopic astigmatism both eyes, high myopia, high astigmatism, significant change in refractive error  Plan: REFRACTION         Educated patient on condition and clinical findings. Dispensed spectacle prescription for full time wear. Monitor annually.   Due to significant change in refractive error, new prescription was trial framed, the patient reported improved vision.    (H40.003) Glaucoma suspect of both eyes  Plan:  Follow-up with Dr. Palomino as scheduled.    (Z86.69) History of subconjunctival hemorrhage  Comment: Reports most recent episode 1 month ago, happens less than 1 time per year, no blood thinner/aspirin use.  Plan:  No further testing indicated at this time. If frequency increases, consider work-up.    Complete documentation of historical and exam elements from today's encounter can  be found in the full encounter summary report (not reduplicated in this progress  note). I personally obtained the chief complaint(s) and history of present illness. I  confirmed and edited as necessary the review of systems, past medical/surgical  history, family history, social history, and examination findings as documented  by  others; and I examined the patient myself. I personally reviewed the relevant tests,  images, and reports as documented above. I formulated and edited as necessary the  assessment and plan and discussed the findings and management plan with the  patient and family.    Chucky Nails OD, FAAO

## 2020-06-26 ENCOUNTER — MYC MEDICAL ADVICE (OUTPATIENT)
Dept: FAMILY MEDICINE | Facility: CLINIC | Age: 60
End: 2020-06-26

## 2020-06-26 DIAGNOSIS — Z80.8 FAMILY HISTORY OF MELANOMA: Primary | ICD-10-CM

## 2020-06-30 ENCOUNTER — TELEPHONE (OUTPATIENT)
Dept: DERMATOLOGY | Facility: CLINIC | Age: 60
End: 2020-06-30

## 2020-06-30 NOTE — TELEPHONE ENCOUNTER
[facility] Call Center    Phone Message    May a detailed message be left on voicemail: yes     Reason for Call: Other: Pt has been referred by Dr Mercado for  Family history of melanoma which the Pt says is a skin check with a couple moles that need attention.  Since we are not scheduling in office visits at this time in your clinic please review in clinic and contact Pt.  Pt was unsure video would be an option for her.    Action Taken: Message routed to:  [facility] ([facility] dermatology    Travel Screening: Not Applicable

## 2020-07-01 ENCOUNTER — MYC MEDICAL ADVICE (OUTPATIENT)
Dept: FAMILY MEDICINE | Facility: CLINIC | Age: 60
End: 2020-07-01

## 2020-07-03 ENCOUNTER — ANCILLARY PROCEDURE (OUTPATIENT)
Dept: MAMMOGRAPHY | Facility: CLINIC | Age: 60
End: 2020-07-03
Attending: FAMILY MEDICINE
Payer: COMMERCIAL

## 2020-07-03 ENCOUNTER — TELEPHONE (OUTPATIENT)
Dept: DERMATOLOGY | Facility: CLINIC | Age: 60
End: 2020-07-03

## 2020-07-03 DIAGNOSIS — Z12.31 VISIT FOR SCREENING MAMMOGRAM: ICD-10-CM

## 2020-07-28 ENCOUNTER — VIRTUAL VISIT (OUTPATIENT)
Dept: DERMATOLOGY | Facility: CLINIC | Age: 60
End: 2020-07-28
Payer: COMMERCIAL

## 2020-07-28 DIAGNOSIS — L82.1 SK (SEBORRHEIC KERATOSIS): Primary | ICD-10-CM

## 2020-07-28 ASSESSMENT — PAIN SCALES - GENERAL: PAINLEVEL: NO PAIN (0)

## 2020-07-28 NOTE — PATIENT INSTRUCTIONS
[facility] Dermatology Visit    Thank you for allowing us to participate in your care. Your findings, instructions and follow-up plan are as follows:    The spot on the temple is a wisdom spot, or seborrheic keratosis. No treatment is needed.    When should I call my doctor?    If you are worsening or not improving, please, contact us or seek urgent care as noted below.     Who should I call with questions (adults)?    [facility] (adult and pediatric): [phone]     [facility] (adult): [phone]    For urgent needs outside of business hours call the [facility] at [phone] and ask for the dermatology resident on call    If this is a medical emergency and you are unable to reach an ER, Call [phone]      Who should I call with questions (pediatric)?  [facility]- Pediatric Dermatology  Dr. Linda Merida, Dr. Prince Zuleta, Dr. Анна Price, Eri Hammond, PA  Dr. Yesika Hilario, Dr. Rupinder Condon & Dr. Christian Hawk  Non Urgent  Nurse Triage Line; [phone]- bri and Suha RN Care Coordinators   Evelina (/Complex ) [phone]    If you need a prescription refill, please contact your pharmacy. Refills are approved or denied by our Physicians during normal business hours, Monday through Fridays  Per office policy, refills will not be granted if you have not been seen within the past year (or sooner depending on your child's condition)    Scheduling Information:  Pediatric Appointment Scheduling and Call Center [phone]  Radiology Scheduling- [phone]  Sedation Unit Scheduling- [phone]  [place] Scheduling- General [phone]; Pediatric Dermatology [phone]  Main  Services: [phone]  Nigerian: [phone]  Solomon Islander: [phone]  Hmong/Indonesian/Maori: [phone]  Preadmission Nursing Department Fax Number: [phone] (Fax all  pre-operative paperwork to this number)    For urgent matters arising during evenings, weekends, or holidays that cannot wait for normal business hours please call [phone] and ask for the Dermatology Resident On-Call to be paged.

## 2020-07-28 NOTE — PROGRESS NOTES
[facility] Dermatology Record:  Store and Forward and Video ( Invitation sent by:  [employer] waiting room )      Dermatology Problem List:  1. SK, face    Encounter Date: Jul 28, 2020    CC:   Chief Complaint   Patient presents with     Derm Problem     Angelita would like a spot on her temple looked at.        History of Present Illness:  Angelita Valentine is a 60 year old female who presents virtually as a new patient for evaluation of spot on her face. Reports family history of melanoma, tries to get a skin check yearly outside of [facility] system, is agreeable to consolidating records as she plans to continue follow-ups here. She has several moles and is unsure if this was present previously. It is not itchy, painful, bleeding, or otherwise bothersome.     ROS: Patient is generally feeling well today.    Physical Examination:  General: Well-appearing, appropriately-developed individual.  Skin: Focused examination including face was performed.   - waxy stuck on tan to brown papule on the face with no concerning features   - no additional lesions of concern seen today     Labs:  N/A     Past Medical History:   Patient Active Problem List   Diagnosis     Family history of Sherwood's esophagus     Osteopenia of neck of femur, unspecified laterality     Postmenopausal status     Family history of renal cancer     Glaucoma suspect of both eyes     Nuclear senile cataract of both eyes     Left tennis elbow     Past Medical History:   Diagnosis Date     Lipoma of other skin and subcutaneous tissue      Seasonal allergies      Sebaceous cyst      Past Surgical History:   Procedure Laterality Date     APPENDECTOMY       BREAST SURGERY  biopsy     C NONSPECIFIC PROCEDURE  08-    excision nevi of neck & abdomen     C NONSPECIFIC PROCEDURE  06-    removal & shave biopsy of cyst on back      COLONOSCOPY N/A 4/12/2016    Procedure: COLONOSCOPY;  Surgeon: Gee Connelly MD;  Location:  GI     GYN SURGERY  ovarian  cyst       Social History:  Patient reports that she has never smoked. She has never used smokeless tobacco. She reports current alcohol use of about 4.0 standard drinks of alcohol per week. She reports that she does not use drugs.    Family History:  Family History   Problem Relation Age of Onset     Esophageal Cancer Father 90         in 2019     Sherwood's esophagus Father      Melanoma Father      Diabetes Type 2  Father      Esophageal Cancer Paternal Aunt 60     Heart Failure Mother 82     GERD Brother      Melanoma Brother      Kidney Cancer Brother      Pancreatic Cancer Paternal Grandmother 57     Glaucoma No family hx of      Macular Degeneration No family hx of        Medications:  Current Outpatient Medications   Medication     cetirizine (ZYRTEC) 10 MG tablet     desonide (DESOWEN) 0.05 % cream     multivitamin w/minerals (MULTI-VITAMIN) tablet     No current facility-administered medications for this visit.        Allergies   Allergen Reactions     Penicillins      Acetaminophen Other (See Comments) and Rash     Oral sores  Oral sores       Ibuprofen Rash     PN: LW Reaction: Rash, Generalize  PN: LW Reaction: Rash, Generalize       Latex Rash     Naproxen Rash     Nsaids Rash     Impression and Recommendations (Patient Counseled on the Following):    # Seborrheic keratosis, face.  - Counseled benign nature of diagnosis  - No further management needed    Follow-up: Already scheduled for FBSE 9/15/20   F/u records from [facility] Dermatology      Staff and resident    Loretta Freire MD   Dermatology Resident   [facility]     Staff Physician Comments:   I saw and evaluated the patient with the resident and I agree with the assessment and plan.  I was present for the examination. I have made edits if needed.    Jt Andino MD  Staff Dermatologist and Dermatopathologist  , Department of  Dermatology    __________________________________________________________________    Teledermatology information:  - Location of patient: [place]  - Patient presented as: return  - Location of teledermatologist:  [facility] DERMATOLOGY )  - Reason teledermatology is appropriate:  of National Emergency Regarding Coronavirus disease (COVID 19) Outbreak  - Image quality and interpretability: acceptable  - Physician has received verbal consent for a Video/Photos Visit from the patient? Yes  - In-person dermatology visit recommendation: no  - Date of images: 7/27/20  - Service start time: 11:07 AM   - Service end time: 11:14 AM   - Date of report: 7/28/2020

## 2020-07-28 NOTE — LETTER
7/28/2020       RE: Angelita Valentine  [address]  [facility] [place] [address]     Dear Colleague,    Thank you for referring your patient, Angelita Valentine, to the [facility] DERMATOLOGY at [facility]. Please see a copy of my visit note below.    [facility] Dermatology Record:  Store and Forward and Video ( Invitation sent by:  [employer] waiting room )      Dermatology Problem List:  1. SK, face    Encounter Date: Jul 28, 2020    CC:   Chief Complaint   Patient presents with     Derm Problem     Angelita would like a spot on her temple looked at.        History of Present Illness:  Angelita Valentine is a 60 year old female who presents virtually as a new patient for evaluation of spot on her face. Reports family history of melanoma, tries to get a skin check yearly outside of [facility] system, is agreeable to consolidating records as she plans to continue follow-ups here. She has several moles and is unsure if this was present previously. It is not itchy, painful, bleeding, or otherwise bothersome.     ROS: Patient is generally feeling well today.    Physical Examination:  General: Well-appearing, appropriately-developed individual.  Skin: Focused examination including face was performed.   - waxy stuck on tan to brown papule on the face with no concerning features   - no additional lesions of concern seen today     Labs:  N/A     Past Medical History:   Patient Active Problem List   Diagnosis     Family history of Sherwood's esophagus     Osteopenia of neck of femur, unspecified laterality     Postmenopausal status     Family history of renal cancer     Glaucoma suspect of both eyes     Nuclear senile cataract of both eyes     Left tennis elbow     Past Medical History:   Diagnosis Date     Lipoma of other skin and subcutaneous tissue      Seasonal allergies      Sebaceous cyst      Past Surgical History:   Procedure Laterality Date     APPENDECTOMY       BREAST SURGERY  biopsy     C NONSPECIFIC  PROCEDURE  08-    excision nevi of neck & abdomen     C NONSPECIFIC PROCEDURE  2000    removal & shave biopsy of cyst on back      COLONOSCOPY N/A 2016    Procedure: COLONOSCOPY;  Surgeon: Gee Connelly MD;  Location:  GI     GYN SURGERY  ovarian cyst       Social History:  Patient reports that she has never smoked. She has never used smokeless tobacco. She reports current alcohol use of about 4.0 standard drinks of alcohol per week. She reports that she does not use drugs.    Family History:  Family History   Problem Relation Age of Onset     Esophageal Cancer Father 90         in 2019     Sherwood's esophagus Father      Melanoma Father      Diabetes Type 2  Father      Esophageal Cancer Paternal Aunt 60     Heart Failure Mother 82     GERD Brother      Melanoma Brother      Kidney Cancer Brother      Pancreatic Cancer Paternal Grandmother 57     Glaucoma No family hx of      Macular Degeneration No family hx of        Medications:  Current Outpatient Medications   Medication     cetirizine (ZYRTEC) 10 MG tablet     desonide (DESOWEN) 0.05 % cream     multivitamin w/minerals (MULTI-VITAMIN) tablet     No current facility-administered medications for this visit.        Allergies   Allergen Reactions     Penicillins      Acetaminophen Other (See Comments) and Rash     Oral sores  Oral sores       Ibuprofen Rash     PN: LW Reaction: Rash, Generalize  PN: LW Reaction: Rash, Generalize       Latex Rash     Naproxen Rash     Nsaids Rash     Impression and Recommendations (Patient Counseled on the Following):    # Seborrheic keratosis, face.  - Counseled benign nature of diagnosis  - No further management needed    Follow-up: Already scheduled for FBSE 9/15/20   F/u records from [facility] Dermatology      Staff and resident    Loretta Freire MD   Dermatology Resident   [facility]     Staff Physician Comments:   I saw and evaluated the patient with the resident and I  agree with the assessment and plan.  I was present for the examination. I have made edits if needed.    Jt Andino MD  Staff Dermatologist and Dermatopathologist  , Department of Dermatology    __________________________________________________________________    Teledermatology information:  - Location of patient: [place]  - Patient presented as: return  - Location of teledermatologist:  ([facility] DERMATOLOGY )  - Reason teledermatology is appropriate:  of National Emergency Regarding Coronavirus disease (COVID 19) Outbreak  - Image quality and interpretability: acceptable  - Physician has received verbal consent for a Video/Photos Visit from the patient? Yes  - In-person dermatology visit recommendation: no  - Date of images: 7/27/20  - Service start time: 11:07 AM   - Service end time: 11:14 AM   - Date of report: 7/28/2020     Again, thank you for allowing me to participate in the care of your patient.      Sincerely,    Jt Andino MD

## 2020-07-28 NOTE — NURSING NOTE
Dermatology Rooming Note    Angelita Valentine's goals for this visit include:   Chief Complaint   Patient presents with     Derm Problem     Angelita would like a spot on her temple looked at.      SHWETHA Tripp

## 2020-08-13 ENCOUNTER — TELEPHONE (OUTPATIENT)
Dept: OPHTHALMOLOGY | Facility: CLINIC | Age: 60
End: 2020-08-13

## 2020-08-13 NOTE — TELEPHONE ENCOUNTER
Called the patient and left a voicemail informing them that the appointment they had scheduled with Dr. Palomino needs to be rescheduled due to the provider leaving the clinic. Voicemail was left and letter was sent.    Patient is active on MyChart. Message was sent and appointment was canceled.

## 2020-09-15 ENCOUNTER — OFFICE VISIT (OUTPATIENT)
Dept: DERMATOLOGY | Facility: CLINIC | Age: 60
End: 2020-09-15
Payer: COMMERCIAL

## 2020-09-15 DIAGNOSIS — Z12.83 SCREENING EXAM FOR SKIN CANCER: ICD-10-CM

## 2020-09-15 DIAGNOSIS — L82.1 SK (SEBORRHEIC KERATOSIS): ICD-10-CM

## 2020-09-15 DIAGNOSIS — L21.9 DERMATITIS, SEBORRHEIC: ICD-10-CM

## 2020-09-15 DIAGNOSIS — D22.9 MULTIPLE BENIGN MELANOCYTIC NEVI: Primary | ICD-10-CM

## 2020-09-15 DIAGNOSIS — Z80.8 FAMILY HISTORY OF MALIGNANT MELANOMA: ICD-10-CM

## 2020-09-15 DIAGNOSIS — D18.01 CHERRY ANGIOMA: ICD-10-CM

## 2020-09-15 RX ORDER — KETOCONAZOLE 20 MG/G
CREAM TOPICAL DAILY
Qty: 60 G | Refills: 3 | Status: SHIPPED | OUTPATIENT
Start: 2020-09-15 | End: 2023-09-12

## 2020-09-15 RX ORDER — DESONIDE 0.5 MG/G
OINTMENT TOPICAL 2 TIMES DAILY
Qty: 60 G | Refills: 3 | Status: SHIPPED | OUTPATIENT
Start: 2020-09-15

## 2020-09-15 RX ORDER — KETOCONAZOLE 20 MG/G
CREAM TOPICAL DAILY
COMMUNITY

## 2020-09-15 ASSESSMENT — PAIN SCALES - GENERAL: PAINLEVEL: NO PAIN (0)

## 2020-09-15 NOTE — NURSING NOTE
"Chief Complaint   Patient presents with     Derm Problem     Angelita is here today for a skin check today. She states \" I want to discuss my eczema medications and discuss the blotches under my eyes\"     Rupinder Trinidad, SHWETHA  "

## 2020-09-15 NOTE — LETTER
"9/15/2020       RE: Angelita Valentine  [address]  [facility] [address]     Dear Colleague,    Thank you for referring your patient, Angelita Valentine, to the [facility] DERMATOLOGY at [facility]. Please see a copy of my visit note below.    [facility] Dermatology Note    Dermatology Problem List:  #. History of moderately dysplastic nevus  #. Family history of melanoma and pancreatic cancer (consider CDKN2A)    CC:   Chief Complaint   Patient presents with     Derm Problem     Angelita is here today for a skin check today. She states \" I want to discuss my eczema medications and discuss the blotches under my eyes\"       Date of Service: Sep 15, 2020    History of Present Illness:  Ms. Angelita Valentine is a 60 year old female who presents as a follow-up for complete skin examination. The patient was last seen in July via virtual visit for a lesion of concern when a seborrheic keratosis was diagnosed.     She notes intermittent erythema of the upper and lower eyelids which is worse in seasonal fashion. Associated with mild to moderate itching. Also sometimes present on the eyebrows, and the external ears.    Otherwise, the patient reports no painful, bleeding, nonhealing, or pruritic lesions, and denies new or changing moles.    Past Medical History:   Patient Active Problem List   Diagnosis     Family history of Sherwood's esophagus     Osteopenia of neck of femur, unspecified laterality     Postmenopausal status     Family history of renal cancer     Glaucoma suspect of both eyes     Nuclear senile cataract of both eyes     Left tennis elbow     Vaginal dryness       Past Medical History:   Diagnosis Date     Lipoma of other skin and subcutaneous tissue      Seasonal allergies      Sebaceous cyst        Past Surgical History:   Procedure Laterality Date     APPENDECTOMY       BREAST SURGERY  biopsy     C NONSPECIFIC PROCEDURE  08-    excision nevi of neck & " abdomen     C NONSPECIFIC PROCEDURE  06-    removal & shave biopsy of cyst on back      COLONOSCOPY N/A 4/12/2016    Procedure: COLONOSCOPY;  Surgeon: Gee Connelly MD;  Location:  GI     GYN SURGERY  ovarian cyst       Social History:  Social History     Socioeconomic History     Marital status:      Spouse name: None     Number of children: None     Years of education: None     Highest education level: None   Occupational History     None   Social Needs     Financial resource strain: Not hard at all     Food insecurity     Worry: Never true     Inability: Never true     Transportation needs     Medical: No     Non-medical: No   Tobacco Use     Smoking status: Never Smoker     Smokeless tobacco: Never Used   Substance and Sexual Activity     Alcohol use: Yes     Alcohol/week: 4.0 standard drinks     Types: 4 Glasses of wine per week     Drinks per session: 3 or 4     Comment: couple a week     Drug use: Never     Sexual activity: Yes     Partners: Male   Lifestyle     Physical activity     Days per week: 4 days     Minutes per session: 30 min     Stress: Only a little   Relationships     Social connections     Talks on phone: None     Gets together: None     Attends Voodoo service: None     Active member of club or organization: None     Attends meetings of clubs or organizations: None     Relationship status: None     Intimate partner violence     Fear of current or ex partner: No     Emotionally abused: No     Physically abused: No     Forced sexual activity: No   Other Topics Concern     Parent/sibling w/ CABG, MI or angioplasty before 65F 55M? Not Asked   Social History Narrative    Retired   to [employer] retired  1996 no children winter in [place]         Family History:  There is a family history of melanoma in the patient's brother and father.    Medications:  Current Outpatient Medications   Medication Sig Dispense Refill     cetirizine (ZYRTEC) 10  MG tablet Take 10 mg by mouth daily       desonide (DESOWEN) 0.05 % cream Apply topically 2 times daily       desonide (DESOWEN) 0.05 % external ointment Apply topically 2 times daily 60 g 3     ketoconazole (NIZORAL) 2 % external cream Apply topically daily       ketoconazole (NIZORAL) 2 % external cream Apply topically daily 60 g 3     multivitamin w/minerals (MULTI-VITAMIN) tablet Take 1 tablet by mouth daily         Allergies:  Allergies   Allergen Reactions     Penicillins      Acetaminophen Other (See Comments) and Rash     Oral sores  Oral sores       Ibuprofen Rash     PN: LW Reaction: Rash, Generalize  PN: LW Reaction: Rash, Generalize       Latex Rash     Naproxen Rash     Nsaids Rash       Review of Systems:  - Skin Establ Pt: The patient denies any new rash, pruritus, or lesions that are symptomatic, changing or bleeding, except as per HPI.  - Constitutional: The patient denies fatigue, fevers, chills, unintended weight loss, and night sweats.  - HEENT: Patient denies nonhealing oral sores.  - Skin: As above in HPI. No additional skin concerns.    Physical exam:  Vitals: There were no vitals taken for this visit.  GEN: This is a well developed, well-nourished female in no acute distress, in a pleasant mood.    SKIN: Full skin, which includes the head/face, both arms, chest, back, abdomen,both legs, genitalia and/or groin buttocks, digits and/or nails, was examined.  - Multiple regular brown pigmented macules and papules are identified on the trunk and extremities.   - There are waxy stuck on tan to brown papules on the trunk.  - There is macular erythema of the glabella and ears with mild flaky white scale.  - No other lesions of concern on areas examined.     Impression/Plan:  #. Benign melanocytic nevi of the trunk  - reassurance provided; no lesions concerning for malignancy  - photoprotection (regular use of SPF30+ broad spectrum sunscreen and sun protective clothing) recommended  - ABCDE of melanoma  discussed    #. Family history of melanoma  - SPF30+ sunscreen  - consider CDKN2A screening for family with children    #. Seborrheic dermatitis  - start ketoconazole 2% cream daily  - start desonide 0.05% ointment BID  - ok to increase Zyrtec to 4 pills daily for seaonal allergies as eyelid involvement may reflect this      Follow-up in 1 year, earlier for new or changing lesions.     Staff Involved:  Staff Only    Jt Andino MD  Dermatology Staff Physician  , Department of Dermatology

## 2020-09-15 NOTE — PATIENT INSTRUCTIONS
Consider genetic testing for CDKN2A for family members on your dad's side given the melanoma and pancreatic cancer history.   - one paper with collecting duct carcinoma of kidney reported related to CDKN2A    Safe to take up to 4 Zyrtec a day to help control seasonal allergy symptoms    The ABCDEs of Melanoma  Asymmetry, Border (irregularity), Color (not uniform, changes in color), Diameter (greater than 6 mm which is about the size of a pencil eraser), and Evolving (any changes in preexisting moles)    Skin cancer can develop anywhere on the skin. Ask someone for help when checking your skin, especially in hard to see places. If you notice a mole different from others, or that changes, enlarges, itches, or bleeds (even if it is small), you should see a dermatologist.

## 2020-09-21 ENCOUNTER — OFFICE VISIT (OUTPATIENT)
Dept: FAMILY MEDICINE | Facility: CLINIC | Age: 60
End: 2020-09-21
Payer: COMMERCIAL

## 2020-09-21 VITALS
DIASTOLIC BLOOD PRESSURE: 81 MMHG | WEIGHT: 150 LBS | BODY MASS INDEX: 22.73 KG/M2 | HEIGHT: 68 IN | OXYGEN SATURATION: 100 % | SYSTOLIC BLOOD PRESSURE: 123 MMHG | HEART RATE: 66 BPM

## 2020-09-21 DIAGNOSIS — Z00.00 ROUTINE HISTORY AND PHYSICAL EXAMINATION OF ADULT: Primary | ICD-10-CM

## 2020-09-21 DIAGNOSIS — N89.8 VAGINAL DRYNESS: ICD-10-CM

## 2020-09-21 DIAGNOSIS — H61.21 IMPACTED CERUMEN OF RIGHT EAR: ICD-10-CM

## 2020-09-21 DIAGNOSIS — Z23 ENCOUNTER FOR IMMUNIZATION: ICD-10-CM

## 2020-09-21 DIAGNOSIS — Z80.51 FAMILY HISTORY OF RENAL CANCER: ICD-10-CM

## 2020-09-21 DIAGNOSIS — Z83.79 FAMILY HISTORY OF BARRETT'S ESOPHAGUS: ICD-10-CM

## 2020-09-21 SDOH — ECONOMIC STABILITY: INCOME INSECURITY: HOW HARD IS IT FOR YOU TO PAY FOR THE VERY BASICS LIKE FOOD, HOUSING, MEDICAL CARE, AND HEATING?: NOT HARD AT ALL

## 2020-09-21 SDOH — SOCIAL STABILITY: SOCIAL INSECURITY
WITHIN THE LAST YEAR, HAVE YOU BEEN KICKED, HIT, SLAPPED, OR OTHERWISE PHYSICALLY HURT BY YOUR PARTNER OR EX-PARTNER?: NO

## 2020-09-21 SDOH — HEALTH STABILITY: MENTAL HEALTH
STRESS IS WHEN SOMEONE FEELS TENSE, NERVOUS, ANXIOUS, OR CAN'T SLEEP AT NIGHT BECAUSE THEIR MIND IS TROUBLED. HOW STRESSED ARE YOU?: ONLY A LITTLE

## 2020-09-21 SDOH — HEALTH STABILITY: PHYSICAL HEALTH: ON AVERAGE, HOW MANY MINUTES DO YOU ENGAGE IN EXERCISE AT THIS LEVEL?: 30 MIN

## 2020-09-21 SDOH — SOCIAL STABILITY: SOCIAL INSECURITY: WITHIN THE LAST YEAR, HAVE YOU BEEN AFRAID OF YOUR PARTNER OR EX-PARTNER?: NO

## 2020-09-21 SDOH — SOCIAL STABILITY: SOCIAL INSECURITY: WITHIN THE LAST YEAR, HAVE YOU BEEN HUMILIATED OR EMOTIONALLY ABUSED IN OTHER WAYS BY YOUR PARTNER OR EX-PARTNER?: NO

## 2020-09-21 SDOH — ECONOMIC STABILITY: FOOD INSECURITY: WITHIN THE PAST 12 MONTHS, YOU WORRIED THAT YOUR FOOD WOULD RUN OUT BEFORE YOU GOT MONEY TO BUY MORE.: NEVER TRUE

## 2020-09-21 SDOH — HEALTH STABILITY: PHYSICAL HEALTH: ON AVERAGE, HOW MANY DAYS PER WEEK DO YOU ENGAGE IN MODERATE TO STRENUOUS EXERCISE (LIKE A BRISK WALK)?: 4 DAYS

## 2020-09-21 SDOH — SOCIAL STABILITY: SOCIAL INSECURITY
WITHIN THE LAST YEAR, HAVE TO BEEN RAPED OR FORCED TO HAVE ANY KIND OF SEXUAL ACTIVITY BY YOUR PARTNER OR EX-PARTNER?: NO

## 2020-09-21 SDOH — ECONOMIC STABILITY: TRANSPORTATION INSECURITY
IN THE PAST 12 MONTHS, HAS LACK OF TRANSPORTATION KEPT YOU FROM MEETINGS, WORK, OR FROM GETTING THINGS NEEDED FOR DAILY LIVING?: NO

## 2020-09-21 SDOH — ECONOMIC STABILITY: TRANSPORTATION INSECURITY
IN THE PAST 12 MONTHS, HAS THE LACK OF TRANSPORTATION KEPT YOU FROM MEDICAL APPOINTMENTS OR FROM GETTING MEDICATIONS?: NO

## 2020-09-21 SDOH — ECONOMIC STABILITY: FOOD INSECURITY: WITHIN THE PAST 12 MONTHS, THE FOOD YOU BOUGHT JUST DIDN'T LAST AND YOU DIDN'T HAVE MONEY TO GET MORE.: NEVER TRUE

## 2020-09-21 ASSESSMENT — ENCOUNTER SYMPTOMS
EYE IRRITATION: 1
EYE WATERING: 1
SINUS CONGESTION: 1

## 2020-09-21 ASSESSMENT — PAIN SCALES - GENERAL: PAINLEVEL: MODERATE PAIN (5)

## 2020-09-21 ASSESSMENT — MIFFLIN-ST. JEOR: SCORE: 1288.9

## 2020-09-21 NOTE — NURSING NOTE
Chief Complaint   Patient presents with     Physical     Kimberly Nissen, EMT at 9:24 AM on 9/21/2020

## 2020-09-21 NOTE — PROGRESS NOTES
[facility]  Primary Care Center   Jorge Mercado MD  2020     Chief Complaint:   Physical   Tennis elbow on the left   Wondering  About covid testing in mid October as she will be visiting elderly relatives    History of Present Illness:   Angelita Valentine is a 60 year old female with a history of a sebaceous cyst and lipoma of skin who presents  for a physical.  She has a family Histroy of several cancers and  draper's esophagus, esophageal cancer in her father who .    Family history:   Esophageal cancer: Her father  esophageal cancer along with her paternal aunt. He had an endoscopy done that showed Draper's esophagus as his went undiagnosed for years, however the doctor recommended that all of the children have an evaluation for draper's esophagus as her father had esophageal cancer and did not have reflux symptoms. Her brother had an endoscopy that did show reflux, however he did not show any signs of Draper's esophagus. Currently, she denies any symptoms of reflux and has not scheduled the endoscopy due to COVID.  Melanoma: She has a family history of Melanoma in her brother and father. She receives regular skin checks. No personal HX of Melanoma.  We discussed genetics referral and she has not checked with her insurance yet but will.  Vaginal Dryness:   Since menopause she has had vaginal dryness that is bothersome during intercourse. She still has her uterus and ovaries. Previously, she used an E-string ring to help but was not able to get a refill of similar products. She has normal  PAP and HPV screening next due in 2021. At a younger age she had endometriosis and some cysts removed.  She denies vaginal bleeding.    Tennis elbow:  She is left handed.  She is experiencing Tennis elbow on the left even though she has lowered her playing for 4 months due to COVID pandemic restrictions that she has  been icing.  She notes pain when lifting 10 pounds of weight. She has trouble  lifting heavy objects    Bone density:    She describes that she takes a chocolate calcium supplement and tries to drink milk with her tea. Due for DEXA  ast last was competed 6/2017  previous had low bone density.    Health Care Maintenance:   Mammogram (females age 40 - 75): yearly or every 2 years - Up to date Last done on 7/03/2020  Pap (females age 21 - 65): every 3 years, every 5 years after age 35 if cotesting done - Up to date 12/13/2016 - normal NIL, HPV negative - Due 12/2021   Colon Cancer screening (age 50 - 75): yearly FIT or colonoscopy every 5 - 10 years - Up to date - Last done on 4/12/2016 and every 10 years recommended.   Dexa (females age ? 65, males age ? 70): every 2 years - Up to date - Last done in 9/2019 showed low done density  Lipid panel: every 5 years, yearly if risk factors - Up to date - 6/2018  Hepatitis C (adults born 1947 - 1965): once per lifetime - Up to date - 7/23/2015  Immunizations (Tetanus every 10 years, Influenza yearly, Pneumonia PPV-23 and PCV-13 age ? 65 or sooner if risk factors) - Recommended - She obtained Shingles vaccine and 2 shots of Hep B one in February and the second in June from [facility] She does not have any conditions qualifying for Pneumonia vaccine at this time  Immunization History   Administered Date(s) Administered     Flu, Unspecified 10/28/2015     HEPA 03/15/2001, 04/08/2002     HepA-Adult 03/15/2001, 04/08/2002     HepB-Adult 06/16/2020 she also had one in February     Influenza (IIV3) PF 12/24/2012, 10/16/2013, 10/07/2014     Influenza (intradermal) 10/28/2015     Influenza Vaccine IM > 6 months Valent IIV4 10/01/2016, 01/02/2020     Influenza Vaccine, 6+MO IM (QUADRIVALENT W/PRESERVATIVES) 10/28/2015     Poliovirus, inactivated (IPV) 03/15/2001     TDAP Vaccine (Adacel) 01/01/2008, 07/27/2018     Typhoid IM 03/15/2001, 01/02/2020     Typhus 03/15/2001     Zoster vaccine recombinant adjuvanted (SHINGRIX) 09/27/2019, 01/02/2020      The following  health maintenance issues were also reviewed and addressed as needed:  Lifestyle habits (including exercise, diet, weight)  Health risk behaviors (sexual history, alcohol, tobacco, drug use, partner violence)  Routine eye, dental, hearing, and skin exams  Advanced directives    Review of Systems:   Pertinent items are noted in HPI or as in patient entered ROS below, remainder of complete ROS is negative.   Answers for HPI/ROS submitted by the patient on 9/21/2020   General Symptoms: No  Skin Symptoms: Yes  HENT Symptoms: Yes  EYE SYMPTOMS: Yes  HEART SYMPTOMS: No  LUNG SYMPTOMS: No  INTESTINAL SYMPTOMS: No  URINARY SYMPTOMS: No  GYNECOLOGIC SYMPTOMS: Yes vaginal dryness now using Ky jelly  BREAST SYMPTOMS: No  SKELETAL SYMPTOMS: Yes  BLOOD SYMPTOMS: No  NERVOUS SYSTEM SYMPTOMS: No  MENTAL HEALTH SYMPTOMS: No  Itching: Yes  Flaking of skin: Yes  Skin thickening: Yes  Ear pain: Yes  Congestion: Yes  Dry eyes: Yes  Watery eyes: Yes eyes maybe from allergy  Eye irritation: Yes  Bone pain: Yes  Vaginal dryness: Yes  Painful intercourse: Yes    Active Medications:      cetirizine (ZYRTEC) 10 MG tablet, Take 10 mg by mouth daily, Disp: , Rfl:      multivitamin w/minerals (MULTI-VITAMIN) tablet, Take 1 tablet by mouth daily, Disp: , Rfl:      desonide (DESOWEN) 0.05 % cream, Apply topically 2 times daily, Disp: , Rfl:       Allergies:   Penicillins; Acetaminophen; Ibuprofen; Latex; Naproxen; and Nsaids      Past Medical History:  Lipoma of skin and subcutaneous tissue  Sebaceous cyst  Seasonal allergies     Past Surgical History:  Appendectomy  Breast biopsy  Excision nevi of neck and abdomen  Removal and shave biopsy of cyst on back  Colonoscopy  GYN surgery     Family History:    Esophageal cancer - father (passed at 90 years old), paternal aunt   Congestive heart failure - mother (passed at 82 years old)  Gastroesophageal reflux - brother  Melanoma - brother, father   Kidney cancer - brother  Pancreatic cancer - paternal  "grandmother (passed at 57 years old)  Breast cancer - maternal aunt  Type 2 diabetes - father    Social History:  This patient presented alone.   Smoking status: not regularly   Smokeless tobacco: never  Alcohol use: yes, 3-4 times a week, wine  Drug use: no     Social History     Social History Narrative    Retired   to [employer] retired  1996 no children winter in [place]     Physical Exam:   /81   Pulse 66   Ht 1.727 m (5' 8\")   Wt 68 kg (150 lb)   SpO2 100%   BMI 22.81 kg/m     BMI= Body mass index is 22.81 kg/m .    GENERAL APPEARANCE: healthy, alert and no distress  EYES: Eyes grossly normal to inspection, PERRL and conjunctivae and sclerae normal  HENT: ear canals and TM's normal, right ears with cerumen Oral exam deferred wearing mask  NECK: no adenopathy, no asymmetry, masses, or scars and thyroid normal to palpation  RESP: lungs clear to auscultation - no rales, rhonchi or wheezes  CV: regular rate and rhythm, normal S1 S2, no S3 or S4, no murmur, click or rub, no peripheral edema and peripheral pulses strong including dorsalis pedis and tibial  ABDOMEN: soft, nontender, no hepatosplenomegaly, no masses and bowel sounds normal  MS: no musculoskeletal defects are noted and gait is age appropriate without ataxia  SKIN: On back there are a few scattered nevi and seborrheic keratosis, none of which are concerning. On abdomen under right and left breast there is a few scattered seborrheic keratosis. Scar on umbilicus from laparoscopic procedure.   NEURO: Normal strength and tone, sensory exam grossly normal, mentation intact and speech normal  PSYCH: mentation appears normal and affect normal/bright   BREAST: normal without masses, tenderness or nipple discharge and no palpable axillary masses or adenopathy    Assessment and Plan:  Angelita Valentine is a 60 year old female with a history of a sebaceous cyst and lipoma of skin who presents  for a physical.   She is well and " has no significant symptoms.  Family history of Sherwood's esophagus and several other cancers including melanoma, pancreatic cancers.Family history of renal cancer  Given that she has a family history of esophageal, pancreatic, kidney, and breast cancer she will consider having genetic consult to determine if genetic  testing  Should be completed.  I discussed genetics referral to check with her insurance to determine if covered.She sees dermatology due to family HX of melanoma  Her father and paternal aunt have Sherwood's esophagus and she would like to have an endoscopy to check for this. She is not currently having any reflux symptoms.  But will defer until after COVID pandemic  - GENETICS REFERRAL  Visit for screening mammogram  Completed 7/2020  Postmenopausal status  Vaginal dryness  She is currently using Ky jelly Due for pap in 12/2021    Reviewed immunizations Encounter for immunization  -     FLU VAC PRESRV FREE QUAD SPLIT VIR 3+YRS IM  -     HEPATITIS B VACCINE, ADULT  Flu vaccine today and last Hep B vaccine today  Low bone denisty  She will try to get 1000 mg of calcium best through nutrition.  Impacted cerumen of right ear  -     REMOVE IMPACTED CERUMEN    I reviewed pros and cons of COVID testing, reviewed pandemic recommendations. She will contact me if she wishes COVID PCR test  Follow-up: Return in about 1 year    Jorge Mercado MD

## 2020-09-21 NOTE — NURSING NOTE
Patient received Hep B and Flu vaccine. Vaccine was given under the supervision of Dr. Mercado. See immunization list for administration details. Pt was advised to remain in [facility] lobby for 15 minutes in case of an averse reaction. Given by Adenike Krueger CMA, EMT 11:27 AM 9/21/2020

## 2020-09-30 NOTE — PROGRESS NOTES
"[facility] Dermatology Note    Dermatology Problem List:  #. History of moderately dysplastic nevus  #. Family history of melanoma and pancreatic cancer (consider CDKN2A)    CC:   Chief Complaint   Patient presents with     Derm Problem     Angelita is here today for a skin check today. She states \" I want to discuss my eczema medications and discuss the blotches under my eyes\"       Date of Service: Sep 15, 2020    History of Present Illness:  Ms. Angelita Valentine is a 60 year old female who presents as a follow-up for complete skin examination. The patient was last seen in July via virtual visit for a lesion of concern when a seborrheic keratosis was diagnosed.     She notes intermittent erythema of the upper and lower eyelids which is worse in seasonal fashion. Associated with mild to moderate itching. Also sometimes present on the eyebrows, and the external ears.    Otherwise, the patient reports no painful, bleeding, nonhealing, or pruritic lesions, and denies new or changing moles.    Past Medical History:   Patient Active Problem List   Diagnosis     Family history of Sherwood's esophagus     Osteopenia of neck of femur, unspecified laterality     Postmenopausal status     Family history of renal cancer     Glaucoma suspect of both eyes     Nuclear senile cataract of both eyes     Left tennis elbow     Vaginal dryness       Past Medical History:   Diagnosis Date     Lipoma of other skin and subcutaneous tissue      Seasonal allergies      Sebaceous cyst        Past Surgical History:   Procedure Laterality Date     APPENDECTOMY       BREAST SURGERY  biopsy     C NONSPECIFIC PROCEDURE  08-    excision nevi of neck & abdomen     C NONSPECIFIC PROCEDURE  06-    removal & shave biopsy of cyst on back      COLONOSCOPY N/A 4/12/2016    Procedure: COLONOSCOPY;  Surgeon: Gee Connelly MD;  Location:  GI     GYN SURGERY  ovarian cyst       Social History:  Social History "     Socioeconomic History     Marital status:      Spouse name: None     Number of children: None     Years of education: None     Highest education level: None   Occupational History     None   Social Needs     Financial resource strain: Not hard at all     Food insecurity     Worry: Never true     Inability: Never true     Transportation needs     Medical: No     Non-medical: No   Tobacco Use     Smoking status: Never Smoker     Smokeless tobacco: Never Used   Substance and Sexual Activity     Alcohol use: Yes     Alcohol/week: 4.0 standard drinks     Types: 4 Glasses of wine per week     Drinks per session: 3 or 4     Comment: couple a week     Drug use: Never     Sexual activity: Yes     Partners: Male   Lifestyle     Physical activity     Days per week: 4 days     Minutes per session: 30 min     Stress: Only a little   Relationships     Social connections     Talks on phone: None     Gets together: None     Attends Amish service: None     Active member of club or organization: None     Attends meetings of clubs or organizations: None     Relationship status: None     Intimate partner violence     Fear of current or ex partner: No     Emotionally abused: No     Physically abused: No     Forced sexual activity: No   Other Topics Concern     Parent/sibling w/ CABG, MI or angioplasty before 65F 55M? Not Asked   Social History Narrative    Retired   to [employer] retired  1996 no children winter in [place]         Family History:  There is a family history of melanoma in the patient's brother and father.    Medications:  Current Outpatient Medications   Medication Sig Dispense Refill     cetirizine (ZYRTEC) 10 MG tablet Take 10 mg by mouth daily       desonide (DESOWEN) 0.05 % cream Apply topically 2 times daily       desonide (DESOWEN) 0.05 % external ointment Apply topically 2 times daily 60 g 3     ketoconazole (NIZORAL) 2 % external cream Apply topically daily        ketoconazole (NIZORAL) 2 % external cream Apply topically daily 60 g 3     multivitamin w/minerals (MULTI-VITAMIN) tablet Take 1 tablet by mouth daily         Allergies:  Allergies   Allergen Reactions     Penicillins      Acetaminophen Other (See Comments) and Rash     Oral sores  Oral sores       Ibuprofen Rash     PN: LW Reaction: Rash, Generalize  PN: LW Reaction: Rash, Generalize       Latex Rash     Naproxen Rash     Nsaids Rash       Review of Systems:  - Skin Establ Pt: The patient denies any new rash, pruritus, or lesions that are symptomatic, changing or bleeding, except as per HPI.  - Constitutional: The patient denies fatigue, fevers, chills, unintended weight loss, and night sweats.  - HEENT: Patient denies nonhealing oral sores.  - Skin: As above in HPI. No additional skin concerns.    Physical exam:  Vitals: There were no vitals taken for this visit.  GEN: This is a well developed, well-nourished female in no acute distress, in a pleasant mood.    SKIN: Full skin, which includes the head/face, both arms, chest, back, abdomen,both legs, genitalia and/or groin buttocks, digits and/or nails, was examined.  - Multiple regular brown pigmented macules and papules are identified on the trunk and extremities.   - There are waxy stuck on tan to brown papules on the trunk.  - There is macular erythema of the glabella and ears with mild flaky white scale.  - No other lesions of concern on areas examined.     Impression/Plan:  #. Benign melanocytic nevi of the trunk  - reassurance provided; no lesions concerning for malignancy  - photoprotection (regular use of SPF30+ broad spectrum sunscreen and sun protective clothing) recommended  - ABCDE of melanoma discussed    #. Family history of melanoma  - SPF30+ sunscreen  - consider CDKN2A screening for family with children    #. Seborrheic dermatitis  - start ketoconazole 2% cream daily  - start desonide 0.05% ointment BID  - ok to increase Zyrtec to 4 pills daily for  seaonal allergies as eyelid involvement may reflect this      Follow-up in 1 year, earlier for new or changing lesions.     Staff Involved:  Staff Only    Jt Andino MD  Dermatology Staff Physician  , Department of Dermatology

## 2020-10-05 ENCOUNTER — OFFICE VISIT (OUTPATIENT)
Dept: OPHTHALMOLOGY | Facility: CLINIC | Age: 60
End: 2020-10-05
Attending: OPHTHALMOLOGY
Payer: COMMERCIAL

## 2020-10-05 DIAGNOSIS — H40.003 GLAUCOMA SUSPECT OF BOTH EYES: Primary | ICD-10-CM

## 2020-10-05 PROCEDURE — 99213 OFFICE O/P EST LOW 20 MIN: CPT | Mod: GC | Performed by: OPHTHALMOLOGY

## 2020-10-05 PROCEDURE — 92133 CPTRZD OPH DX IMG PST SGM ON: CPT | Performed by: OPHTHALMOLOGY

## 2020-10-05 ASSESSMENT — REFRACTION_WEARINGRX
OS_CYLINDER: +4.00
OD_AXIS: 109
SPECS_TYPE: PAL
OD_SPHERE: -9.50
OS_AXIS: 089
OS_ADD: +2.50
OS_SPHERE: -10.75
OD_ADD: +2.50
OD_CYLINDER: +3.75

## 2020-10-05 ASSESSMENT — VISUAL ACUITY
OS_CC: 20/30
OD_CC+: -1
OS_PH_CC: 20/20
METHOD: SNELLEN - LINEAR
OS_CC+: -1
OD_CC: 20/20
OS_PH_CC+: -3

## 2020-10-05 ASSESSMENT — TONOMETRY
IOP_METHOD: APPLANATION
OD_IOP_MMHG: 15
OS_IOP_MMHG: 16

## 2020-10-05 ASSESSMENT — CUP TO DISC RATIO
OD_RATIO: 0.5
OS_RATIO: 0.6

## 2020-10-05 ASSESSMENT — SLIT LAMP EXAM - LIDS
COMMENTS: NORMAL
COMMENTS: NORMAL

## 2020-10-05 NOTE — PROGRESS NOTES
CC: follow up glaucoma suspect   HPI: Angelita Valentine is a 60 year old year-old patient with history of glaucoma suspect. Here for evaluation.  Vision stable, no changes.    -- retired      Will obtain old notes and visual field tests.  Patient will return to clinic in 8-12 months with visual field test, dilated eye exam, and disc photos.  If stable will consider following IOPs and ONH.     Testing:  OCT rNFL:   RE: normal   LE: stable TI thinning    Assessment & Plan:  1)Glc Suspect   - K pachy:  536/533    Tmax:  16/16     OVF: Fluct        CDR:0.4/0.5       HRT/OCT: Mild RNFl thinning         FHX of Glc: No     Gonio: open     Asthma/COPD: No  Steroid Use: No     Kidney Stones: No    Sulfa Allergy: No         2)NS OU   3)High Myopia  4)H/O RT OS    Inferior nasal tear left eye   No Retinal detachment    5)CAROLE -- travels to [place] in winter     6) history of Posterior vitreous detachment both eyes   Retina detachment precautions were discussed with the patient (presence or increased in flashes, floaters or a curtain in the visual field) and was asked to return if any of the those occur     Follow up in one yr with baudilio; sooner as needed   optos pic, ONFL and OVF24-2    Gisele Montero MD  Ophthalmology PGY-3  [facility]  Pager: [phone]      ~~~~~~~~~~~~~~~~~~~~~~~~~~~~~~~~~~   Complete documentation of historical and exam elements from today's encounter can be found in the full encounter summary report (not reduplicated in this progress note).  I personally obtained the chief complaint(s) and history of present illness.  I confirmed and edited as necessary the review of systems, past medical/surgical history, family history, social history, and examination findings as documented by others; and I examined the patient myself.  I personally reviewed the relevant tests, images, and reports as documented above.  I personally reviewed the ophthalmic test(s) associated with this encounter, agree with  the interpretation(s) as documented by the resident/fellow, and have edited the corresponding report(s) as necessary.   I formulated and edited as necessary the assessment and plan and discussed the findings and management plan with the patient and family    Aminata Zamora MD   of Ophthalmology.  Retina Service   Department of Ophthalmology and Visual Neurosciences   [facility]  Phone: [phone]   Fax: [phone]

## 2020-12-28 ENCOUNTER — MYC MEDICAL ADVICE (OUTPATIENT)
Dept: FAMILY MEDICINE | Facility: CLINIC | Age: 60
End: 2020-12-28

## 2021-10-23 ENCOUNTER — HEALTH MAINTENANCE LETTER (OUTPATIENT)
Age: 61
End: 2021-10-23

## 2021-11-18 ENCOUNTER — TRANSFERRED RECORDS (OUTPATIENT)
Dept: HEALTH INFORMATION MANAGEMENT | Facility: CLINIC | Age: 61
End: 2021-11-18

## 2022-10-10 ENCOUNTER — HEALTH MAINTENANCE LETTER (OUTPATIENT)
Age: 62
End: 2022-10-10

## 2022-11-27 ENCOUNTER — HEALTH MAINTENANCE LETTER (OUTPATIENT)
Age: 62
End: 2022-11-27

## 2023-05-26 ENCOUNTER — HOSPITAL ENCOUNTER (EMERGENCY)
Facility: CLINIC | Age: 63
Discharge: HOME | End: 2023-05-26
Attending: EMERGENCY MEDICINE | Admitting: EMERGENCY MEDICINE
Payer: COMMERCIAL

## 2023-05-26 VITALS
HEART RATE: 63 BPM | SYSTOLIC BLOOD PRESSURE: 135 MMHG | DIASTOLIC BLOOD PRESSURE: 76 MMHG | RESPIRATION RATE: 16 BRPM | OXYGEN SATURATION: 100 % | BODY MASS INDEX: 24.25 KG/M2 | TEMPERATURE: 97.6 F | HEIGHT: 68 IN | WEIGHT: 160 LBS

## 2023-05-26 DIAGNOSIS — M79.622 PAIN OF LEFT UPPER ARM: ICD-10-CM

## 2023-05-26 LAB
ALBUMIN SERPL BCG-MCNC: 4.7 G/DL (ref 3.5–5.2)
ALP SERPL-CCNC: 69 U/L (ref 35–104)
ALT SERPL W P-5'-P-CCNC: 11 U/L (ref 10–35)
ANION GAP SERPL CALCULATED.3IONS-SCNC: 12 MMOL/L (ref 7–15)
AST SERPL W P-5'-P-CCNC: 20 U/L (ref 10–35)
ATRIAL RATE - MUSE: 57 BPM
BASOPHILS # BLD AUTO: 0.1 10E3/UL (ref 0–0.2)
BASOPHILS NFR BLD AUTO: 1 %
BILIRUB SERPL-MCNC: 0.6 MG/DL
BUN SERPL-MCNC: 13.5 MG/DL (ref 8–23)
CALCIUM SERPL-MCNC: 9.3 MG/DL (ref 8.8–10.2)
CHLORIDE SERPL-SCNC: 104 MMOL/L (ref 98–107)
CREAT SERPL-MCNC: 0.68 MG/DL (ref 0.51–0.95)
DEPRECATED HCO3 PLAS-SCNC: 25 MMOL/L (ref 22–29)
DIASTOLIC BLOOD PRESSURE - MUSE: NORMAL MMHG
EOSINOPHIL # BLD AUTO: 0.1 10E3/UL (ref 0–0.7)
EOSINOPHIL NFR BLD AUTO: 2 %
ERYTHROCYTE [DISTWIDTH] IN BLOOD BY AUTOMATED COUNT: 12 % (ref 10–15)
GFR SERPL CREATININE-BSD FRML MDRD: >90 ML/MIN/1.73M2
GLUCOSE SERPL-MCNC: 120 MG/DL (ref 70–99)
HCT VFR BLD AUTO: 40 % (ref 35–47)
HGB BLD-MCNC: 13.6 G/DL (ref 11.7–15.7)
IMM GRANULOCYTES # BLD: 0 10E3/UL
IMM GRANULOCYTES NFR BLD: 0 %
INTERPRETATION ECG - MUSE: NORMAL
LYMPHOCYTES # BLD AUTO: 1.1 10E3/UL (ref 0.8–5.3)
LYMPHOCYTES NFR BLD AUTO: 15 %
MCH RBC QN AUTO: 32.1 PG (ref 26.5–33)
MCHC RBC AUTO-ENTMCNC: 34 G/DL (ref 31.5–36.5)
MCV RBC AUTO: 94 FL (ref 78–100)
MONOCYTES # BLD AUTO: 0.5 10E3/UL (ref 0–1.3)
MONOCYTES NFR BLD AUTO: 7 %
NEUTROPHILS # BLD AUTO: 5.7 10E3/UL (ref 1.6–8.3)
NEUTROPHILS NFR BLD AUTO: 75 %
NRBC # BLD AUTO: 0 10E3/UL
NRBC BLD AUTO-RTO: 0 /100
P AXIS - MUSE: 61 DEGREES
PLATELET # BLD AUTO: 212 10E3/UL (ref 150–450)
POTASSIUM SERPL-SCNC: 4.1 MMOL/L (ref 3.4–5.3)
PR INTERVAL - MUSE: 142 MS
PROT SERPL-MCNC: 6.7 G/DL (ref 6.4–8.3)
QRS DURATION - MUSE: 92 MS
QT - MUSE: 424 MS
QTC - MUSE: 412 MS
R AXIS - MUSE: 64 DEGREES
RBC # BLD AUTO: 4.24 10E6/UL (ref 3.8–5.2)
SODIUM SERPL-SCNC: 141 MMOL/L (ref 136–145)
SYSTOLIC BLOOD PRESSURE - MUSE: NORMAL MMHG
T AXIS - MUSE: 69 DEGREES
TROPONIN T SERPL HS-MCNC: <6 NG/L
TROPONIN T SERPL HS-MCNC: <6 NG/L
VENTRICULAR RATE- MUSE: 57 BPM
WBC # BLD AUTO: 7.4 10E3/UL (ref 4–11)

## 2023-05-26 PROCEDURE — 99283 EMERGENCY DEPT VISIT LOW MDM: CPT | Mod: 25 | Performed by: EMERGENCY MEDICINE

## 2023-05-26 PROCEDURE — 36415 COLL VENOUS BLD VENIPUNCTURE: CPT | Performed by: EMERGENCY MEDICINE

## 2023-05-26 PROCEDURE — 84484 ASSAY OF TROPONIN QUANT: CPT | Performed by: EMERGENCY MEDICINE

## 2023-05-26 PROCEDURE — 99284 EMERGENCY DEPT VISIT MOD MDM: CPT | Performed by: EMERGENCY MEDICINE

## 2023-05-26 PROCEDURE — 85025 COMPLETE CBC W/AUTO DIFF WBC: CPT | Performed by: EMERGENCY MEDICINE

## 2023-05-26 PROCEDURE — 93005 ELECTROCARDIOGRAM TRACING: CPT | Performed by: EMERGENCY MEDICINE

## 2023-05-26 PROCEDURE — 80053 COMPREHEN METABOLIC PANEL: CPT | Performed by: EMERGENCY MEDICINE

## 2023-05-26 PROCEDURE — 93010 ELECTROCARDIOGRAM REPORT: CPT | Performed by: EMERGENCY MEDICINE

## 2023-05-26 ASSESSMENT — ACTIVITIES OF DAILY LIVING (ADL)
ADLS_ACUITY_SCORE: 35
ADLS_ACUITY_SCORE: 35

## 2023-05-26 NOTE — ED PROVIDER NOTES
ED Provider Note  [facility]      History     Chief Complaint   Patient presents with     Arm Pain     Woke up at 0330 with severe, sharp arm pain & severe nausea. Pain now is constant aching & nausea is mild. Denies chest pain. Neuros intact.      HPI  Angelita Valentine is a 62 year old female with PMHx of anxiety who presents to the ED with  for sharp, cramping left arm pain onset around 3:30am this morning with associated nausea. Patient reports the pain has improved and is now more of a dull ache, but she is still quite nauseous. The pain radiates slightly toward the back, but does not radiate to the jaw or down the arm. Patient denies numbness or tingling and has normal range of motion. Pain does not worsen with movement. Patient denies vomiting, abdominal pain, abnormal bowel movements, difficulty breathing, trouble swallowing, or excessive sweating. Patient denies history of clots. Denies recent travel or illness. Per chart review, patient is left-hand dominant.    Past Medical History  Past Medical History:   Diagnosis Date     Lipoma of other skin and subcutaneous tissue      Seasonal allergies      Sebaceous cyst      Past Surgical History:   Procedure Laterality Date     APPENDECTOMY       BREAST SURGERY  biopsy     COLONOSCOPY N/A 4/12/2016    Procedure: COLONOSCOPY;  Surgeon: Gee Connelly MD;  Location:  GI     GYN SURGERY  ovarian cyst     [facility] NONSPECIFIC PROCEDURE  08-    excision nevi of neck & abdomen     [facility] NONSPECIFIC PROCEDURE  06-    removal & shave biopsy of cyst on back      cetirizine (ZYRTEC) 10 MG tablet  desonide (DESOWEN) 0.05 % cream  desonide (DESOWEN) 0.05 % external ointment  ketoconazole (NIZORAL) 2 % external cream  ketoconazole (NIZORAL) 2 % external cream  multivitamin w/minerals (MULTI-VITAMIN) tablet      Allergies   Allergen Reactions     Penicillins      Acetaminophen Other (See Comments) and Rash     Oral sores  Oral  "sores       Ibuprofen Rash     PN: LW Reaction: Rash, Generalize  PN: LW Reaction: Rash, Generalize       Latex Rash     Naproxen Rash     Nsaids Rash     Family History  Family History   Problem Relation Age of Onset     Esophageal Cancer Father 90         in 2019     Sherwood's esophagus Father      Melanoma Father      Diabetes Type 2  Father      Esophageal Cancer Paternal Aunt 60     Heart Failure Mother 82     GERD Brother      Melanoma Brother      Kidney Cancer Brother      Pancreatic Cancer Paternal Grandmother 57     Glaucoma No family hx of      Macular Degeneration No family hx of      Social History   Social History     Tobacco Use     Smoking status: Never     Smokeless tobacco: Never   Substance Use Topics     Alcohol use: Yes     Alcohol/week: 4.0 standard drinks of alcohol     Types: 4 Glasses of wine per week     Comment: couple a week     Drug use: Never         A medically appropriate review of systems was performed with pertinent positives and negatives noted in the HPI, and all other systems negative.    Physical Exam   BP: 135/76  Pulse: 63  Temp: 97.6  F (36.4  C)  Resp: 16  Height: 172.7 cm (5' 8\")  Weight: 72.6 kg (160 lb)  SpO2: 100 %  Physical Exam  Physical Exam   Constitutional: oriented to person, place, and time. appears well-developed and well-nourished.   HENT:   Head: Normocephalic and atraumatic.   Neck: Normal range of motion.   Pulmonary/Chest: Effort normal. No respiratory distress.   Cardiac: No murmurs, rubs, gallops. RRR.  Abdominal: Abdomen soft, nontender, nondistended. No rebound tenderness.  MSK: Long bones without deformity or evidence of trauma.  Left arm without swelling.  Sensation intact light touch in all extremities.  Radial pulses 2+ and symmetric bilaterally.  Compartment soft.  Range of motion of the shoulder, elbow, wrist normal without pain, warmth, restriction.  No pain with external or internal rotation of the left shoulder.  No pain over the left " arm.  Neurological: alert and oriented to person, place, and time.   Skin: Skin is warm and dry.   Psychiatric:  normal mood and affect.  behavior is normal. Thought content normal.       ED Course, Procedures, & Data      Procedures       ED Course Selections:        EKG Interpretation:      Interpreted by Anibal Nielson MD  Time reviewed: 0600  Symptoms at time of EKG: arm pain   Rhythm: normal sinus   Rate: normal  Axis: normal  Ectopy: none  Conduction: normal  ST Segments/ T Waves: No ST-T wave changes  Q Waves: none  Comparison to prior: Unchanged    Clinical Impression: normal EKG                     Results for orders placed or performed during the hospital encounter of 05/26/23   EKG 12-lead, tracing only     Status: None (Preliminary result)   Result Value Ref Range    Systolic Blood Pressure  mmHg    Diastolic Blood Pressure  mmHg    Ventricular Rate 57 BPM    Atrial Rate 57 BPM    NV Interval 142 ms    QRS Duration 92 ms     ms    QTc 412 ms    P Axis 61 degrees    R AXIS 64 degrees    T Axis 69 degrees    Interpretation ECG       Sinus bradycardia  Low voltage QRS  Borderline ECG       Medications - No data to display  Labs Ordered and Resulted from Time of ED Arrival to Time of ED Departure - No data to display  No orders to display          Critical care was not performed.     Medical Decision Making  The patient's presentation was of high complexity (an acute health issue posing potential threat to life or bodily function).    The patient's evaluation involved:  ordering and/or review of 3+ test(s) in this encounter (see separate area of note for details)  strong consideration of a test (CT PE) that was ultimately deferred    The patient's management necessitated only low risk treatment.      Assessment & Plan    MDM  Patient presenting with left arm pain, nausea.  No symptoms or signs of DVT.  Will do a set of labs to assess for ACS, overall low risk.  If undetectable likely can be  discharged with follow-up.  She does not have any other chest pain, shortness of breath.  Very unlikely PE, pneumonia, pneumothorax, do not feel imaging is necessary.  No history to suggest fracture.  Limb is otherwise neurovascularly intact.  There is no deficit or neurologic change, less likely stroke or cervicalgia.  Patient will be signed out to oncoming physician    I have reviewed the nursing notes. I have reviewed the findings, diagnosis, plan and need for follow up with the patient.    New Prescriptions    No medications on file       Final diagnoses:   Pain of left upper arm   Scribe Disclosure:  By signing my name below, Rhiannon KATZ, attest that this documentation has been prepared under the direction of Anibal Nielson MD,based on data collection and the provider's statements to me.  Electronically Signed: Dianelys Toro.     Provider Disclosure:  GIANNA, Anibal Nielson MD, have reviewed the content of the documentation as recorded by the scribe and have made edits where needed. I have personally performed the services documented here and attest that the documentation accurately represents the decisions I have made.    Anibal Nielson  [facility] EMERGENCY DEPARTMENT  5/26/2023     Anibal Nielson MD  05/26/23 0697

## 2023-05-26 NOTE — ED TRIAGE NOTES
"Pt presents to ED from home with  at side. Steady on feet. A7Ox4. Afebrile. C/O \"woke up with sharp pain to left arm with cramping, sharp severe pain & now it's a dull ache.\" Started around 330am. C/o nausea.      Triage Assessment     Row Name 05/26/23 0564       Triage Assessment (Adult)    Airway WDL WDL       Respiratory WDL    Respiratory WDL WDL       Skin Circulation/Temperature WDL    Skin Circulation/Temperature WDL WDL       Cardiac WDL    Cardiac WDL WDL       Peripheral/Neurovascular WDL    Peripheral Neurovascular WDL WDL       Cognitive/Neuro/Behavioral WDL    Cognitive/Neuro/Behavioral WDL WDL              "

## 2023-05-26 NOTE — DISCHARGE INSTRUCTIONS
Your cardiac work-up was negative it is unlikely that your arm pain is related to coronary disease.  Make a follow-up appointment with your primary care provider.  Return to the Emergency Department if you are having problems or concerns.

## 2023-05-26 NOTE — ED NOTES
10:08 AM this is a 62-year-old female who is generally healthy woke up with ache in her left arm it was sharp it is gotten better now and its more a dull ache no weakness no paresthesia work-up initiated by Dr. Nielson was routine labs and EKG she has had 2 troponins now both of which are negative.  She does not have weakness or paresthesia I think it is unlikely this is cardiac or stroke and its been going on for 6 hours I think she can go home take Tylenol and ibuprofen and return if things change otherwise make a routine clinic follow-up appointment.     Ciro Luo MD  05/26/23 5073

## 2023-08-14 ENCOUNTER — TRANSFERRED RECORDS (OUTPATIENT)
Dept: HEALTH INFORMATION MANAGEMENT | Facility: CLINIC | Age: 63
End: 2023-08-14

## 2023-08-14 ENCOUNTER — OFFICE VISIT (OUTPATIENT)
Dept: FAMILY MEDICINE | Facility: CLINIC | Age: 63
End: 2023-08-14
Payer: MEDICARE

## 2023-08-14 VITALS
RESPIRATION RATE: 16 BRPM | TEMPERATURE: 98 F | BODY MASS INDEX: 24.8 KG/M2 | SYSTOLIC BLOOD PRESSURE: 124 MMHG | OXYGEN SATURATION: 98 % | HEART RATE: 70 BPM | DIASTOLIC BLOOD PRESSURE: 77 MMHG | WEIGHT: 163.1 LBS

## 2023-08-14 DIAGNOSIS — E78.5 HYPERLIPIDEMIA LDL GOAL <100: ICD-10-CM

## 2023-08-14 DIAGNOSIS — Z23 ENCOUNTER FOR IMMUNIZATION: ICD-10-CM

## 2023-08-14 DIAGNOSIS — F41.9 ANXIETY AND DEPRESSION: ICD-10-CM

## 2023-08-14 DIAGNOSIS — Z83.79 FAMILY HISTORY OF BARRETT'S ESOPHAGUS: ICD-10-CM

## 2023-08-14 DIAGNOSIS — H57.02 ANISOCORIA: Chronic | ICD-10-CM

## 2023-08-14 DIAGNOSIS — R11.0 NAUSEA: ICD-10-CM

## 2023-08-14 DIAGNOSIS — R20.0 NUMBNESS AND TINGLING IN LEFT ARM: ICD-10-CM

## 2023-08-14 DIAGNOSIS — Z80.51 FAMILY HISTORY OF RENAL CANCER: Primary | ICD-10-CM

## 2023-08-14 DIAGNOSIS — H61.23 BILATERAL IMPACTED CERUMEN: ICD-10-CM

## 2023-08-14 DIAGNOSIS — R20.2 NUMBNESS AND TINGLING IN LEFT ARM: ICD-10-CM

## 2023-08-14 DIAGNOSIS — Z78.0 ASYMPTOMATIC POSTMENOPAUSAL STATUS: ICD-10-CM

## 2023-08-14 DIAGNOSIS — R51.9 PRESSURE IN HEAD: ICD-10-CM

## 2023-08-14 DIAGNOSIS — F32.A ANXIETY AND DEPRESSION: ICD-10-CM

## 2023-08-14 DIAGNOSIS — M62.9 DISORDER OF MUSCLE, UNSPECIFIED: ICD-10-CM

## 2023-08-14 PROCEDURE — 99215 OFFICE O/P EST HI 40 MIN: CPT | Mod: 25 | Performed by: FAMILY MEDICINE

## 2023-08-14 PROCEDURE — G0009 ADMIN PNEUMOCOCCAL VACCINE: HCPCS | Performed by: FAMILY MEDICINE

## 2023-08-14 PROCEDURE — 90677 PCV20 VACCINE IM: CPT | Performed by: FAMILY MEDICINE

## 2023-08-14 PROCEDURE — 99417 PROLNG OP E/M EACH 15 MIN: CPT | Performed by: FAMILY MEDICINE

## 2023-08-14 RX ORDER — ESCITALOPRAM OXALATE 20 MG/1
20 TABLET ORAL DAILY
Qty: 90 TABLET | Refills: 1 | Status: SHIPPED | OUTPATIENT
Start: 2023-08-14 | End: 2024-02-27

## 2023-08-14 RX ORDER — ESCITALOPRAM OXALATE 5 MG/1
5 TABLET ORAL DAILY
COMMUNITY
End: 2023-08-14 | Stop reason: DRUGHIGH

## 2023-08-14 RX ORDER — ESCITALOPRAM OXALATE 10 MG/1
10 TABLET ORAL DAILY
COMMUNITY
End: 2023-08-14 | Stop reason: DRUGHIGH

## 2023-08-14 ASSESSMENT — ANXIETY QUESTIONNAIRES
6. BECOMING EASILY ANNOYED OR IRRITABLE: MORE THAN HALF THE DAYS
7. FEELING AFRAID AS IF SOMETHING AWFUL MIGHT HAPPEN: MORE THAN HALF THE DAYS
GAD7 TOTAL SCORE: 12
2. NOT BEING ABLE TO STOP OR CONTROL WORRYING: SEVERAL DAYS
3. WORRYING TOO MUCH ABOUT DIFFERENT THINGS: NEARLY EVERY DAY
5. BEING SO RESTLESS THAT IT IS HARD TO SIT STILL: NOT AT ALL
IF YOU CHECKED OFF ANY PROBLEMS ON THIS QUESTIONNAIRE, HOW DIFFICULT HAVE THESE PROBLEMS MADE IT FOR YOU TO DO YOUR WORK, TAKE CARE OF THINGS AT HOME, OR GET ALONG WITH OTHER PEOPLE: SOMEWHAT DIFFICULT
1. FEELING NERVOUS, ANXIOUS, OR ON EDGE: MORE THAN HALF THE DAYS
GAD7 TOTAL SCORE: 12

## 2023-08-14 ASSESSMENT — PATIENT HEALTH QUESTIONNAIRE - PHQ9
5. POOR APPETITE OR OVEREATING: MORE THAN HALF THE DAYS
SUM OF ALL RESPONSES TO PHQ QUESTIONS 1-9: 7

## 2023-08-14 NOTE — PATIENT INSTRUCTIONS
Start Vit D daily 1000 international unit(s)  Calcium 1000 mg daily through nutrition and if not able then take calcium in the evening to total 1000 mg    To schedule your appointment with imaging for an MRI, please call [phone].

## 2023-08-14 NOTE — PROGRESS NOTES
Assessment & Plan     Family history of renal cancer  She has a family history of renal cancer completed genetic testing which indicate increased risk of renal cancer therefore annual MRI of the abdomen was recommended.  - MR Abdomen w/o & w Contrast    Family history of Sherwood's esophagus  She completed endoscopy results not available she will sign release of records for this.    Numbness and tingling in left arm  Pressure in head  Nausea  Disorder of muscle, unspecified  She has intermittent nausea and foggy head feeling pressure in her head I think it would be prudent for us to obtain an MRI of the brain, consideration may be change of pressure in her brain or other abnormality, with left arm numbness possibility of condition such as MS  - TSH with free T4 reflex  - MR Brain w/o & w Contrast      Anisocoria  She will complete follow-up with her ophthalmologist and inquire if left eye pupil was noted to be larger than right eye in the past.  She has noted visual change.    Anxiety and depression  Encouraged to adjust dose of escitalopram to 20 mg previously taking 15 mg  - escitalopram (LEXAPRO) 20 MG tablet  Dispense: 90 tablet; Refill: 1    Bilateral impacted cerumen  Bilateral ear irrigation  - REMOVE IMPACTED CERUMEN    Hyperlipidemia LDL goal <100  Due for lipids  - Lipid panel reflex to direct LDL Fasting    Asymptomatic postmenopausal status  Due for bone density  - DX Hip/Pelvis/Spine    Encounter for immunization  PCV 20 provided today reviewed influenza and COVID booster in the fall.  - PNEUMOCOCCAL 20 VALENT CONJUGATE (PREVNAR 20)    75 minutes spent on the date of the encounter doing chart review, history, exam, diagnostics review, documentation, counseling and coordination of cares as noted.    Patient Instructions   Start Vit D daily 1000 international unit(s)  Calcium 1000 mg daily through nutrition and if not able then take calcium in the evening to total 1000 mg    To schedule your appointment  with imaging for an MRI, please call [phone].         Return in about 4 weeks (around 2023) for follow-up.    Jorge Mercado MD  [facility]IMARY [facility] TELLO Goldstein is a 63 year old, presenting for the following health issues:  Nausea (Pt reports nausea and fatigue for the last 3 years. It mostly comes and goes but it has been affecting day to day life more recently.), Musculoskeletal Problem (Pt wants to discuss left arm pain (previously evaluated by ER) that has localized more to arm pit area.), and Anxiety (Pt has had more anxiety/startle reflex increase)    HPI   Angelita Valentine 63 history of a sebaceous cyst, lipoma of skin, low bone density,  presents for evaluation, last visit .    Nausea  Nausea and fatigue since  visiting family in [place], [facility] headed decreased appetite. Thought it was anxiety related from traveling last a few days. Was going to travel this fall and decided not to as was worried she would be ill.   She has a family history of several cancers and draper's esophagus, father (90) & Paternal Aunt  from esophageal cancer. She completed EGD ( results not available). She completed genetic evaluation which noted increase risk for renal cancer recommended annual MRI abdomen.  She had CT calcium score ,  Zero, reviewed  Left arm cramping,Fingers were numb, nasuea, left arm numbness and aching on an off, tingling,  recent ER visit with normal EKG. Left axilla tenderness and had a mammogram. Aching in the left arm after doing weights, No neck pain when moving neck, sleeps on left side.  Head down and notes pounding between ears. Startle reflex  increased noise bothers her, she has anxiety but worried there is something else causing symptoms.   She sees [facility] EYE clinic. They have not mention anisocoria left pupil larger than right, She has some visual change and will see EYE provider.   Bone density  She tries to get calcium  through nutrition occasional calcium chew, Due for DEXA, last was competed  2019 low bone density.   HCM  Due for preventive visit (actually completed 2023 through another clinic)  PCV20  Last mammogram   Had colonoscopy 2016 next   Family history  Esophageal cancer:father(90) & paternal aunt   esophageal cancer. Her father completed endoscopy indicating Sherwood's esophagus undiagnosed for years as he was asymptomatic.  Currently, she denies any symptoms of reflux and did not scheduled  endoscopy due to COVID pandemic she completed endoscopy .  Melanoma: She has a family history of Melanoma  brother and father. She receives regular skin checks annually. No personal HX of Melanoma.  We  previously discussed genetics referral she completed and brought copy in for the first time today. Reviewed today see scanned, she only has a portion of the recommendations and will bring complete report.  Social History     Social History Narrative    Retired   to [employer] retired   no children winter in [place]        Labs reviewed in EPIC  BP Readings from Last 3 Encounters:   23 124/77   23 135/76   20 123/81    Wt Readings from Last 3 Encounters:   23 74 kg (163 lb 1.6 oz)   23 72.6 kg (160 lb)   20 68 kg (150 lb)            Immunization History   Administered Date(s) Administered    COVID-19 Bivalent 12+ (Pfizer) 10/17/2022    COVID-19 Monovalent 12+ (Pfizer ) 2022    Flu, Unspecified 10/28/2015    HEPA 03/15/2001, 2002    Hepatitis A (ADULT 19+) 03/15/2001, 2002    Hepatitis B, Adult 2020, 2020    Influenza (IIV3) PF 2012, 10/16/2013, 10/07/2014    Influenza (intradermal) 10/28/2015    Influenza Vaccine >6 months (Alfuria,Fluzone) 10/01/2016, 2020, 2020    Influenza Vaccine, 6+MO IM (QUADRIVALENT W/PRESERVATIVES) 10/28/2015    Poliovirus, inactivated (IPV) 03/15/2001    TDAP Vaccine  (Adacel) 2008, 2018    Typhoid IM 03/15/2001, 2020    Typhus 03/15/2001    Zoster recombinant adjuvanted (SHINGRIX) 2019, 2020            Patient Active Problem List   Diagnosis    Family history of Sherwood's esophagus    Osteopenia of neck of femur, unspecified laterality    Postmenopausal status    Family history of renal cancer    Glaucoma suspect of both eyes    Nuclear senile cataract of both eyes    Left tennis elbow    Vaginal dryness    Hyperlipidemia LDL goal <100     Past Surgical History:   Procedure Laterality Date    APPENDECTOMY      BREAST SURGERY  biopsy    COLONOSCOPY N/A 2016    Procedure: COLONOSCOPY;  Surgeon: Gee Connelly MD;  Location:  GI    GYN SURGERY  ovarian cyst    [facility] NONSPECIFIC PROCEDURE  08-    excision nevi of neck & abdomen    [facility] NONSPECIFIC PROCEDURE  2000    removal & shave biopsy of cyst on back        Social History     Tobacco Use    Smoking status: Former     Packs/day: 1.00     Years: 19.00     Pack years: 19.00     Types: Cigarettes     Start date: 1976     Quit date: 1995     Years since quittin.0    Smokeless tobacco: Never    Tobacco comments:     Second hand smoke   Substance Use Topics    Alcohol use: Yes     Alcohol/week: 5.0 standard drinks of alcohol     Types: 5 Glasses of wine per week     Comment: 2 glasses if out     Family History   Problem Relation Age of Onset    Heart Failure Mother 82    Esophageal Cancer Father 90         in 2019    Sherwood's esophagus Father     Melanoma Father     Diabetes Type 2  Father     GERD Brother     Melanoma Brother     Kidney Cancer Brother     Lung Cancer Brother 69        smoker    Heart Disease Maternal Grandmother 80    Cerebrovascular Disease Maternal Grandfather 65    Pancreatic Cancer Paternal Grandmother 57    Prostate Cancer Paternal Grandfather 80    Esophageal Cancer Paternal Aunt 60    Glaucoma No family hx of     Macular  Degeneration No family hx of          Current Outpatient Medications   Medication Sig Dispense Refill    cetirizine (ZYRTEC) 10 MG tablet Take 10 mg by mouth daily      desonide (DESOWEN) 0.05 % cream Apply topically 2 times daily      desonide (DESOWEN) 0.05 % external ointment Apply topically 2 times daily 60 g 3    ketoconazole (NIZORAL) 2 % external cream Apply topically daily      ketoconazole (NIZORAL) 2 % external cream Apply topically daily 60 g 3    multivitamin w/minerals (MULTI-VITAMIN) tablet Take 1 tablet by mouth daily      escitalopram (LEXAPRO) 10 MG tablet Take 10 mg by mouth daily      escitalopram (LEXAPRO) 5 MG tablet Take 5 mg by mouth daily       Allergies   Allergen Reactions    Penicillins     Acetaminophen Other (See Comments) and Rash     Oral sores  Oral sores      Clindamycin Rash    Ibuprofen Rash     PN: LW Reaction: Rash, Generalize  PN: LW Reaction: Rash, Generalize      Latex Rash    Naproxen Rash    Nsaids Rash     Recent Labs   Lab Test 05/26/23  0652   ALT 11   CR 0.68   GFRESTIMATED >90   POTASSIUM 4.1         Review of Systems   Problem list, PMH, Surgical HX, FH, SH, allergies, medications,immunizations reviewed and updated in Epic. 10 point ROS negative other than noted in HPI and ROS.       Objective    /77 (BP Location: Right arm, Patient Position: Sitting, Cuff Size: Adult Regular)   Pulse 70   Temp 98  F (36.7  C)   Resp 16   Wt 74 kg (163 lb 1.6 oz)   SpO2 98%   BMI 24.80 kg/m    Body mass index is 24.8 kg/m .  Physical Exam     GENERAL APPEARANCE: healthy, alert and no distress, slightly anxious  EYES: left eye mildly enlarged pupil 3 mm compared to right 2 mm Both reactive, EOMI and conjunctivae and sclerae normal  HENT: ear canals  packed cerumen obscuring TM's, Oral exam  no concerns Palate rises symmetrically, tongue extends midline.  NECK: no adenopathy, no asymmetry, masses, or scars and thyroid normal to palpation  RESP: lungs clear to auscultation -  no rales, rhonchi or wheezes  CV: regular rate and rhythm, normal S1 S2, no S3 or S4, no murmur, click or rub, no peripheral edema   ABDOMEN: soft, nontender, no hepatosplenomegaly, no masses and bowel sounds normal  MS:  Moves all extremities, no abnormalities noted, no musculoskeletal defects are noted and gait is age appropriate without ataxia  SKIN: few scattered nevi and seborrheic keratosis, none of which are concerning. On abdomen under right and left breast there is a few scattered seborrheic keratosis. Scar on umbilicus from laparoscopic procedure.   NEURO: Normal strength and tone, sensory exam grossly normal, mentation intact and speech normal See eye exam, Finger to nose intact, neg pronator drift, Tandum walk intact  PSYCH: mentation appears normal, intelligent, taking notes, anxious and appreciative.Apologetic she does not have full genetic document.      8/14/2023     9:57 AM   PHQ   PHQ-9 Total Score 7   Q9: Thoughts of better off dead/self-harm past 2 weeks Not at all          8/14/2023     9:57 AM   RUSSELL-7 SCORE   Total Score 12      No results found for any visits on 08/14/23.       6/8/2023  Impression  BREAST COMPOSITION:  The breasts are heterogeneously dense, which may   obscure small masses.     FINDINGS:  There is no suspicious mammographic finding.     Negative study.    RECOMMENDATION:  Further management should be based clinically.  Recommend  continuing with yearly screening mammography.    Results and recommendations were discussed with the patient at the time of  the exam.      BI-RADS Category 2: Benign    Michelle Salazar M.D.  Diagnostic/Breast Radiologist  [facility]  [email or web address]  ANDRES/shaw  D:6/8/2023 T:6/8/2023   Ultrasound over the lump and pain in the upper outer LEFT breast from 12   to 3 o'clock position and LEFT axilla demonstrates normal underlying   glandular tissue and normal-morphology axillary lymph nodes.          Above reviewed at  appt from outside  provider  Jorge Mercado MD

## 2023-08-16 ENCOUNTER — TRANSFERRED RECORDS (OUTPATIENT)
Dept: HEALTH INFORMATION MANAGEMENT | Facility: CLINIC | Age: 63
End: 2023-08-16

## 2023-08-18 ENCOUNTER — MYC MEDICAL ADVICE (OUTPATIENT)
Dept: FAMILY MEDICINE | Facility: CLINIC | Age: 63
End: 2023-08-18
Payer: MEDICARE

## 2023-08-23 ENCOUNTER — LAB (OUTPATIENT)
Dept: LAB | Facility: CLINIC | Age: 63
End: 2023-08-23
Attending: FAMILY MEDICINE
Payer: MEDICARE

## 2023-08-23 DIAGNOSIS — R11.0 NAUSEA: ICD-10-CM

## 2023-08-23 DIAGNOSIS — M62.9 DISORDER OF MUSCLE, UNSPECIFIED: ICD-10-CM

## 2023-08-23 DIAGNOSIS — E78.5 HYPERLIPIDEMIA LDL GOAL <100: ICD-10-CM

## 2023-08-23 LAB
CHOLEST SERPL-MCNC: 227 MG/DL
HDLC SERPL-MCNC: 73 MG/DL
LDLC SERPL CALC-MCNC: 139 MG/DL
NONHDLC SERPL-MCNC: 154 MG/DL
TRIGL SERPL-MCNC: 77 MG/DL
TSH SERPL DL<=0.005 MIU/L-ACNC: 3.24 UIU/ML (ref 0.3–4.2)

## 2023-08-23 PROCEDURE — 80061 LIPID PANEL: CPT | Performed by: PATHOLOGY

## 2023-08-23 PROCEDURE — 84443 ASSAY THYROID STIM HORMONE: CPT | Performed by: PATHOLOGY

## 2023-08-23 PROCEDURE — 36415 COLL VENOUS BLD VENIPUNCTURE: CPT | Performed by: PATHOLOGY

## 2023-08-25 NOTE — RESULT ENCOUNTER NOTE
Dear Angelita Valentine   Thyroid in range  Cholesterol total slightly high due to high  and high protective HDL 73 but your risk score 5% is in goal of less than 7.5%, continue to lower cholesterol in your diet but you do not need cholesterol lowering medication at this time.  Best wishes,  Jorge Mercado MD

## 2023-08-28 ENCOUNTER — HOSPITAL ENCOUNTER (OUTPATIENT)
Dept: MRI IMAGING | Facility: CLINIC | Age: 63
Discharge: HOME | End: 2023-08-28
Attending: FAMILY MEDICINE
Payer: MEDICARE

## 2023-08-28 DIAGNOSIS — R20.2 NUMBNESS AND TINGLING IN LEFT ARM: ICD-10-CM

## 2023-08-28 DIAGNOSIS — R20.0 NUMBNESS AND TINGLING IN LEFT ARM: ICD-10-CM

## 2023-08-28 DIAGNOSIS — R11.0 NAUSEA: ICD-10-CM

## 2023-08-28 DIAGNOSIS — R51.9 PRESSURE IN HEAD: ICD-10-CM

## 2023-08-28 DIAGNOSIS — Z80.51 FAMILY HISTORY OF RENAL CANCER: ICD-10-CM

## 2023-08-28 PROCEDURE — 70553 MRI BRAIN STEM W/O & W/DYE: CPT | Mod: MG

## 2023-08-28 PROCEDURE — G1010 CDSM STANSON: HCPCS

## 2023-08-28 PROCEDURE — 74183 MRI ABD W/O CNTR FLWD CNTR: CPT | Mod: MG

## 2023-08-28 PROCEDURE — 74183 MRI ABD W/O CNTR FLWD CNTR: CPT | Mod: 26 | Performed by: RADIOLOGY

## 2023-08-28 PROCEDURE — G1010 CDSM STANSON: HCPCS | Performed by: RADIOLOGY

## 2023-08-28 PROCEDURE — A9585 GADOBUTROL INJECTION: HCPCS | Mod: JZ | Performed by: FAMILY MEDICINE

## 2023-08-28 PROCEDURE — 255N000002 HC RX 255 OP 636: Mod: JZ | Performed by: FAMILY MEDICINE

## 2023-08-28 PROCEDURE — 70553 MRI BRAIN STEM W/O & W/DYE: CPT | Mod: 26 | Performed by: RADIOLOGY

## 2023-08-28 RX ORDER — GADOBUTROL 604.72 MG/ML
0.1 INJECTION INTRAVENOUS ONCE
Status: COMPLETED | OUTPATIENT
Start: 2023-08-28 | End: 2023-08-28

## 2023-08-28 RX ADMIN — GADOBUTROL 7.4 ML: 604.72 INJECTION INTRAVENOUS at 15:12

## 2023-08-31 NOTE — RESULT ENCOUNTER NOTE
Dear Angelita Valentine   MRI of the brain shows no current concerning findings, good result.  Jorge Mercado MD

## 2023-08-31 NOTE — RESULT ENCOUNTER NOTE
Dear Angelita Valentine   Results of MRI of abdomen show no concerning masses in the abdomen.  There is a tiny 8 mm hemangioma of no concern in the liver.  There was small amount of gallstones in the gallbladder known as cholelithiasis without evidence of irritation known as cholecystitis.  Please contact me if questions by making a follow-up appointment.  Jorge Mercado MD

## 2023-09-11 ENCOUNTER — OFFICE VISIT (OUTPATIENT)
Dept: FAMILY MEDICINE | Facility: CLINIC | Age: 63
End: 2023-09-11
Payer: MEDICARE

## 2023-09-11 ENCOUNTER — ALLIED HEALTH/NURSE VISIT (OUTPATIENT)
Dept: INTERNAL MEDICINE | Facility: CLINIC | Age: 63
End: 2023-09-11
Payer: MEDICARE

## 2023-09-11 VITALS
HEART RATE: 65 BPM | BODY MASS INDEX: 25.19 KG/M2 | WEIGHT: 165.7 LBS | DIASTOLIC BLOOD PRESSURE: 75 MMHG | TEMPERATURE: 98 F | SYSTOLIC BLOOD PRESSURE: 113 MMHG | OXYGEN SATURATION: 98 %

## 2023-09-11 DIAGNOSIS — Z23 ENCOUNTER FOR IMMUNIZATION: ICD-10-CM

## 2023-09-11 DIAGNOSIS — F41.9 ANXIETY AND DEPRESSION: ICD-10-CM

## 2023-09-11 DIAGNOSIS — K80.20 CALCULUS OF GALLBLADDER WITHOUT CHOLECYSTITIS WITHOUT OBSTRUCTION: ICD-10-CM

## 2023-09-11 DIAGNOSIS — F32.A ANXIETY AND DEPRESSION: ICD-10-CM

## 2023-09-11 DIAGNOSIS — Z13.71 TESTING OF FEMALE FOR GENETIC DISEASE CARRIER STATUS: Primary | ICD-10-CM

## 2023-09-11 DIAGNOSIS — H61.23 BILATERAL IMPACTED CERUMEN: Primary | ICD-10-CM

## 2023-09-11 DIAGNOSIS — B00.9 HERPES SIMPLEX VIRUS INFECTION: ICD-10-CM

## 2023-09-11 PROCEDURE — 90662 IIV NO PRSV INCREASED AG IM: CPT | Performed by: FAMILY MEDICINE

## 2023-09-11 PROCEDURE — G0008 ADMIN INFLUENZA VIRUS VAC: HCPCS | Performed by: FAMILY MEDICINE

## 2023-09-11 PROCEDURE — 99214 OFFICE O/P EST MOD 30 MIN: CPT | Mod: 25 | Performed by: FAMILY MEDICINE

## 2023-09-11 PROCEDURE — 69209 REMOVE IMPACTED EAR WAX UNI: CPT | Mod: LT

## 2023-09-11 RX ORDER — ALPRAZOLAM 0.25 MG
TABLET ORAL
COMMUNITY
Start: 2023-07-21 | End: 2023-09-12

## 2023-09-11 RX ORDER — VALACYCLOVIR HYDROCHLORIDE 1 G/1
2000 TABLET, FILM COATED ORAL 2 TIMES DAILY
Qty: 4 TABLET | Refills: 0 | Status: SHIPPED | OUTPATIENT
Start: 2023-09-11 | End: 2023-09-12

## 2023-09-11 ASSESSMENT — ANXIETY QUESTIONNAIRES
2. NOT BEING ABLE TO STOP OR CONTROL WORRYING: NEARLY EVERY DAY
GAD7 TOTAL SCORE: 12
6. BECOMING EASILY ANNOYED OR IRRITABLE: SEVERAL DAYS
7. FEELING AFRAID AS IF SOMETHING AWFUL MIGHT HAPPEN: SEVERAL DAYS
1. FEELING NERVOUS, ANXIOUS, OR ON EDGE: NEARLY EVERY DAY
IF YOU CHECKED OFF ANY PROBLEMS ON THIS QUESTIONNAIRE, HOW DIFFICULT HAVE THESE PROBLEMS MADE IT FOR YOU TO DO YOUR WORK, TAKE CARE OF THINGS AT HOME, OR GET ALONG WITH OTHER PEOPLE: SOMEWHAT DIFFICULT
GAD7 TOTAL SCORE: 12
5. BEING SO RESTLESS THAT IT IS HARD TO SIT STILL: NOT AT ALL
3. WORRYING TOO MUCH ABOUT DIFFERENT THINGS: NEARLY EVERY DAY

## 2023-09-11 ASSESSMENT — PATIENT HEALTH QUESTIONNAIRE - PHQ9
5. POOR APPETITE OR OVEREATING: SEVERAL DAYS
SUM OF ALL RESPONSES TO PHQ QUESTIONS 1-9: 5

## 2023-09-11 NOTE — PROGRESS NOTES
Angelita Valentine presents in the primary clinic today at the request of herself for an ear wash. The patient's ears were assessed for cerumen. After this, an ear wash was performed in which a large amount of impacted cerumen was removed from the left ear. The patient reported using debrox drops in the left ear for four days beforehand. The cerumen on this side was much easier to remove and debrox on the left side was recommended to allow the same improvement on the right side. The ear wash was provided today under the supervision of Dr. Wright who was present if help was needed.    Mark West, EMT at 2:16 PM on 9/11/2023.  Primary Care Clinic: [phone]

## 2023-09-11 NOTE — NURSING NOTE
Angelita Valentine is a 63 year old female patient that presents today in clinic for the following:    Chief Complaint   Patient presents with    Follow Up     Genetic testing questions; anxiety concerns per pt, left side of tongue feels sore per pt      The patient's allergies and medications were reviewed as noted. A set of vitals were recorded as noted without incident: /75 (BP Location: Right arm, Patient Position: Sitting, Cuff Size: Adult Regular)   Pulse 65   Temp 98  F (36.7  C)   Wt 75.2 kg (165 lb 11.2 oz)   SpO2 98%   BMI 25.19 kg/m  . The patient does not have any other questions for the provider.    Melony Rankin, EMT at 2:23 PM on 9/11/2023

## 2023-09-11 NOTE — PROGRESS NOTES
Assessment & Plan     Testing of female for genetic disease carrier status  I reviewed her genetic testing, reviewed imaging in detail as noted.  Her genetic testing indicates MRI of abdomen required annually due to possible history of renal cancer.  Also genetic testing indicates risk for leiomyoma in particular uterine suggesting annual GYN pelvic exam.  Reviewed baseline pelvic ultrasound is recommended as MRI did not image pelvis as we did not have complete genetic testing results available at her last visit.  - US Pelvic Complete with Transvaginal    Encounter for immunization  Today provided influenza vaccine I recommended COVID booster when available other vaccines are up-to-date.  - INFLUENZA VACCINE 65+ (FLUZONE HD)    Herpes simplex virus infection  Today reviewed herpes lesions on tongue recommended valacyclovir 2000 mg  twice daily for 1 day I reviewed in detail sometimes pharmacy dispenses 500 mg therefore she would take 4 tablets twice daily for 1 day.  - valACYclovir (VALTREX) 1000 mg tablet  Dispense: 4 tablet; Refill: 0    Anxiety and depression  She is on Lexapro 20 mg daily.  I advised against use of Xanax due to habit-forming I suggested Benadryl 25 mg to 50 mg if needed for an occasional episode of anxiety.  Also reviewed at times could utilize gabapentin.  I reviewed gabapentin as a prescription medication.  She appears to have an attention component impacting her memory and that although discussing in detail results at the end of the visit she forgot we had discussed cholelithiasis.  May need to consider neuropsychological testing in a later date.  I encouraged her to seek evaluation of psychiatry she will check with her insurance I reviewed [facility] is a way to search for mental health provider.  She will let me know if she needs assistance in mental health referral.    Calculus of gallbladder without cholecystitis without obstruction  I reviewed in detail cholelithiasis and  potential complications including pancreatitis.  She has occasional nausea with eating fatty food but has not had cholecystitis to her knowledge.  She declines surgical consultation at this time.  Cerumen impaction right ear, she will return for ear irrigation after use of Debrox in the right ear as well.               37 minutes spent on the date of the encounter doing chart review, history, exam, diagnostics review, documentation, counseling and coordination of cares as noted.   Return in about 4 weeks (around 10/9/2023).    Jorge Mercado MD  [facility] [place]    Britt Goldstein is a 63 year old, presenting for the following health issues:  Follow Up (Genetic testing questions; anxiety concerns per pt, left side of tongue feels sore per pt )    MELISSA Valentine 63 history of a sebaceous cyst, lipoma of skin, low bone density,  presents for evaluation, Discuss genetic testing results and follow-up of imaging.   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 She did not have the full document the last visit therefore she provided full document for me today to be scanned.  She has a family history of several cancers and draper's esophagus, father (90) & Paternal Aunt  from esophageal cancer. She completed EGD ( results not available).  She has significant anxiety was requesting a refill of Xanax which I indicated I utilize rarely only prior to imaging.  She had CT calcium score ,  Zero, reviewed  Left arm cramping,Fingers were numb, nasuea, left arm numbness and aching on an off, tingling,  recent ER visit with normal EKG. Left axilla tenderness and had a mammogram. Aching in the left arm after doing weights, No neck pain when moving  neck, sleeps on left side.  Head down and notes pounding between ears. Startle reflex  increased noise bothers her, she has anxiety but worried there is something else causing symptoms therefore we ordered MRI of brain images reviewed today appear normal with the exception of possible previous left sinus mucosal thickening she denies sinus symptoms.  She also had cerumen bilateral ears had used Debrox in left ear but not right staff was only able to clear left ear today..   She sees [facility]. They have not mention anisocoria left pupil larger than right, She has some visual change and will see EYE provider.  Imaging discussion, cholelithiasis  I reviewed imaging in detail in particular abdominal MRI showed no specific concerns with the kidney small hemangioma in the liver we had to review in detail.  Also reviewed cholelithiasis in the gallbladder showed pictures in detail.  At the end of the visit she had trouble remembering we discussed cholelithiasis.  MRI of the brain shows no abnormality other than previous resolved left sinus infection with mucosal thickening she denied symptoms at this time.  Tongue pain  She has noted sores, ulcerations along the left side of her tongue that are painful.  She feels this is a stress response.  Anxiety  She is working on trying to find a counselor to assist with anxiety.  Currently takes Xanax small amount for severe anxiety flares requesting a prescription, has not tried Benadryl   Bone density  She tries to get calcium through nutrition occasional calcium chew, Due for DEXA, last was competed  2019 low bone density.   HCM  Due for preventive visit 2024 (actually completed 2023 through another clinic)  PCV 20 provided last visit  Flu vaccine  Covid booster when available  Last mammogram   Had colonoscopy 2016 next   Family history  Esophageal cancer:father(90) & paternal aunt   esophageal cancer. Her father completed endoscopy indicating  Sherwood's esophagus undiagnosed for years as he was asymptomatic.  Currently, she denies any symptoms of reflux and did not scheduled  endoscopy due to COVID pandemic she completed endoscopy 2021.  Melanoma: She has a family history of Melanoma  brother and father. She receives regular skin checks annually. No personal HX of Melanoma.   Labs reviewed in EPIC  BP Readings from Last 3 Encounters:   09/11/23 113/75   08/14/23 124/77   05/26/23 135/76    Wt Readings from Last 3 Encounters:   09/11/23 75.2 kg (165 lb 11.2 oz)   08/14/23 74 kg (163 lb 1.6 oz)   05/26/23 72.6 kg (160 lb)            Immunization History   Administered Date(s) Administered    COVID-19 Bivalent 12+ (Pfizer) 10/17/2022    COVID-19 Monovalent 12+ (Pfizer 2022) 05/31/2022    Flu, Unspecified 10/28/2015    HEPA 03/15/2001, 04/08/2002    Hepatitis A (ADULT 19+) 03/15/2001, 04/08/2002    Hepatitis B, Adult 06/16/2020, 09/21/2020    Influenza (IIV3) PF 12/24/2012, 10/16/2013, 10/07/2014    Influenza (intradermal) 10/28/2015    Influenza Vaccine 65+ (Fluzone HD) 09/11/2023    Influenza Vaccine >6 months (Alfuria,Fluzone) 10/01/2016, 01/02/2020, 09/21/2020    Influenza Vaccine, 6+MO IM (QUADRIVALENT W/PRESERVATIVES) 10/28/2015    Pneumococcal 20 valent Conjugate (Prevnar 20) 08/14/2023    Poliovirus, inactivated (IPV) 03/15/2001    TDAP Vaccine (Adacel) 01/01/2008, 07/27/2018    Typhoid IM 03/15/2001, 01/02/2020    Typhus 03/15/2001    Zoster recombinant adjuvanted (SHINGRIX) 09/27/2019, 01/02/2020            Patient Active Problem List   Diagnosis    Family history of Sherwood's esophagus    Osteopenia of neck of femur, unspecified laterality    Postmenopausal status    Family history of renal cancer    Glaucoma suspect of both eyes    Nuclear senile cataract of both eyes    Left tennis elbow    Vaginal dryness    Hyperlipidemia LDL goal <100    Anxiety and depression    Anisocoria     Past Surgical History:   Procedure Laterality Date    APPENDECTOMY       BREAST SURGERY  biopsy    COLONOSCOPY N/A 2016    Procedure: COLONOSCOPY;  Surgeon: Gee Connelly MD;  Location:  GI    GYN SURGERY  ovarian cyst    [facility] NONSPECIFIC PROCEDURE  08-    excision nevi of neck & abdomen    [facility] NONSPECIFIC PROCEDURE  2000    removal & shave biopsy of cyst on back        Social History     Tobacco Use    Smoking status: Former     Packs/day: 1.00     Years: 19.00     Pack years: 19.00     Types: Cigarettes     Start date: 1976     Quit date: 1995     Years since quittin.1    Smokeless tobacco: Never    Tobacco comments:     Second hand smoke   Substance Use Topics    Alcohol use: Yes     Alcohol/week: 5.0 standard drinks of alcohol     Types: 5 Glasses of wine per week     Comment: 2 glasses if out     Family History   Problem Relation Age of Onset    Heart Failure Mother 82    Esophageal Cancer Father 90         in 2019    Sherwood's esophagus Father     Melanoma Father     Diabetes Type 2  Father     GERD Brother     Melanoma Brother     Kidney Cancer Brother     Lung Cancer Brother 69        smoker    Heart Disease Maternal Grandmother 80    Cerebrovascular Disease Maternal Grandfather 65    Pancreatic Cancer Paternal Grandmother 57    Prostate Cancer Paternal Grandfather 80    Esophageal Cancer Paternal Aunt 60    Glaucoma No family hx of     Macular Degeneration No family hx of          Current Outpatient Medications   Medication Sig Dispense Refill    ALPRAZolam (XANAX) 0.25 MG tablet Take one daily as needed for severe anxiety.  Do *not take with alcohol      desonide (DESOWEN) 0.05 % cream Apply topically 2 times daily      desonide (DESOWEN) 0.05 % external ointment Apply topically 2 times daily 60 g 3    escitalopram (LEXAPRO) 20 MG tablet Take 1 tablet (20 mg) by mouth daily 90 tablet 1    ketoconazole (NIZORAL) 2 % external cream Apply topically daily      cetirizine (ZYRTEC) 10 MG tablet Take 10 mg by mouth daily       ketoconazole (NIZORAL) 2 % external cream Apply topically daily 60 g 3    multivitamin w/minerals (MULTI-VITAMIN) tablet Take 1 tablet by mouth daily (Patient not taking: Reported on 9/11/2023)       Allergies   Allergen Reactions    Penicillins     Acetaminophen Other (See Comments) and Rash     Oral sores  Oral sores      Clindamycin Rash    Ibuprofen Rash     PN: LW Reaction: Rash, Generalize  PN: LW Reaction: Rash, Generalize      Latex Rash    Naproxen Rash    Nsaids Rash     Recent Labs   Lab Test 08/23/23  0703 05/26/23  0652   *  --    HDL 73  --    TRIG 77  --    ALT  --  11   CR  --  0.68   GFRESTIMATED  --  >90   POTASSIUM  --  4.1   TSH 3.24  --          Review of Systems   Problem list, PMH, Surgical HX, FH, SH, allergies, medications,immunizations reviewed and updated in Epic. 10 point ROS negative other than noted in HPI and ROS.  She denies pelvic symptoms no lower abdominal pressure.      Objective    /75 (BP Location: Right arm, Patient Position: Sitting, Cuff Size: Adult Regular)   Pulse 65   Temp 98  F (36.7  C)   Wt 75.2 kg (165 lb 11.2 oz)   SpO2 98%   BMI 25.19 kg/m    Body mass index is 25.19 kg/m .  Physical Exam     GENERAL APPEARANCE: healthy, alert and no distress, anxious  EYES: left eye mildly enlarged pupil 3 mm compared to right 2 mm Both reactive, EOMI and conjunctivae and sclerae normal  HENT: ear canals left ear small amount of cerumen but able to see tympanic membranes normal right ear cerumen obscuring TM's, Oral exam tongue on left side small vesicles, palate rises symmetrically, tongue extends midline, no thrush posterior pharynx nonerythematous.  NECK: no adenopathy, no asymmetry, masses, or scars and thyroid normal to palpation  RESP: lungs clear to auscultation - no rales, rhonchi or wheezes  CV: regular rate and rhythm, normal S1 S2, no S3 or S4, no murmur, click or rub, no peripheral edema   PSYCH: mentation appears detailed, intelligent, taking notes,  anxious.  Today after reviewing information in detail she appeared to not remember what we had discussed.  I did repeat MRI imaging several times likely an attention or short-term memory concern.                MRI Brain 8/28/23  Retention cysts and mucosal  thickening in the left maxillary sinus. Mastoid air cells are clear.  The orbits are grossly unremarkable.                                                          Impression:  1. No acute intracranial pathology.  2. No imaging finding to explain patient's symptoms.  MRI Abdomen 8/28/23  Results of MRI of abdomen show no concerning masses in the abdomen.  There is a tiny 8 mm hemangioma of no concern in the liver.  There was small amount of gallstones in the gallbladder known as cholelithiasis without evidence of irritation known as cholecystitis.

## 2023-09-11 NOTE — PATIENT INSTRUCTIONS
Get covid vaccine through local pharmacy when available    Trial benadryl 25 mg if needed for occassional anxiety      COMMUNITY RESOURCES:    988 dial for mental health service emergency  New resource : Mental health or substance use online search  [employer] [place]: [facility]

## 2023-09-12 ENCOUNTER — TELEPHONE (OUTPATIENT)
Dept: FAMILY MEDICINE | Facility: CLINIC | Age: 63
End: 2023-09-12
Payer: MEDICARE

## 2023-09-12 PROBLEM — K80.20 CALCULUS OF GALLBLADDER WITHOUT CHOLECYSTITIS WITHOUT OBSTRUCTION: Status: ACTIVE | Noted: 2023-09-12

## 2023-09-21 ENCOUNTER — ANCILLARY PROCEDURE (OUTPATIENT)
Dept: BONE DENSITY | Facility: CLINIC | Age: 63
End: 2023-09-21
Attending: FAMILY MEDICINE
Payer: MEDICARE

## 2023-09-21 DIAGNOSIS — Z78.0 ASYMPTOMATIC POSTMENOPAUSAL STATUS: ICD-10-CM

## 2023-09-21 PROCEDURE — 77080 DXA BONE DENSITY AXIAL: CPT | Performed by: INTERNAL MEDICINE

## 2023-10-06 ENCOUNTER — ALLIED HEALTH/NURSE VISIT (OUTPATIENT)
Dept: INTERNAL MEDICINE | Facility: CLINIC | Age: 63
End: 2023-10-06
Payer: MEDICARE

## 2023-10-06 DIAGNOSIS — H61.23 BILATERAL IMPACTED CERUMEN: Primary | ICD-10-CM

## 2023-10-06 PROCEDURE — 69209 REMOVE IMPACTED EAR WAX UNI: CPT | Mod: 50

## 2023-10-06 NOTE — PROGRESS NOTES
Angelita Valentine presents in the primary clinic today at the request of herself for an ear wash. The patient's ears were assessed for cerumen. After this, an ear wash was performed in which a moderate amount of impacted cerumen was removed from both ear/s. After the ear wash, the tympanic membrane was visible. The patient reports ongoing symptoms of pounding in the ears and hearing loss. They were instructed to message or set up a visit if the symptoms persist. The ear wash was provided today under the supervision of Dr. Wright who was present if help was needed.    Mark West, EMT at 9:49 AM on 10/6/2023.  Primary Care Clinic: [phone]

## 2023-12-16 ENCOUNTER — TRANSFERRED RECORDS (OUTPATIENT)
Dept: HEALTH INFORMATION MANAGEMENT | Facility: CLINIC | Age: 63
End: 2023-12-16

## 2024-02-27 ENCOUNTER — MYC MEDICAL ADVICE (OUTPATIENT)
Dept: FAMILY MEDICINE | Facility: CLINIC | Age: 64
End: 2024-02-27
Payer: MEDICARE

## 2024-02-27 DIAGNOSIS — F41.9 ANXIETY AND DEPRESSION: ICD-10-CM

## 2024-02-27 DIAGNOSIS — F32.A ANXIETY AND DEPRESSION: ICD-10-CM

## 2024-02-27 NOTE — TELEPHONE ENCOUNTER
escitalopram (LEXAPRO) 20 MG tablet   Last Written Prescription Date:  8/14/2023  Last Fill Quantity: 90,   # refills: 1  Last Office Visit : 9/11/2023  Future Office visit:  None    Routing refill request to provider for review/approval because:  Second request  Needing updated PHQ-9 on file for this med  Refer to clinic for review     Tamica Spaulding RN  Central Triage Red Flags/Med Refills

## 2024-03-04 RX ORDER — ESCITALOPRAM OXALATE 20 MG/1
20 TABLET ORAL DAILY
Qty: 90 TABLET | Refills: 1 | Status: SHIPPED | OUTPATIENT
Start: 2024-03-04 | End: 2024-04-29

## 2024-03-05 NOTE — TELEPHONE ENCOUNTER
Farzad Mercado,     I spoke to the pharmacy, they confirmed the medication was received and filled today 3/5/24.     Thank you   Maranda DOMINGO

## 2024-04-29 ENCOUNTER — OFFICE VISIT (OUTPATIENT)
Dept: FAMILY MEDICINE | Facility: CLINIC | Age: 64
End: 2024-04-29
Payer: MEDICARE

## 2024-04-29 VITALS
SYSTOLIC BLOOD PRESSURE: 126 MMHG | HEART RATE: 62 BPM | TEMPERATURE: 97.4 F | BODY MASS INDEX: 26.2 KG/M2 | OXYGEN SATURATION: 99 % | WEIGHT: 172.3 LBS | DIASTOLIC BLOOD PRESSURE: 80 MMHG

## 2024-04-29 DIAGNOSIS — F32.A ANXIETY AND DEPRESSION: ICD-10-CM

## 2024-04-29 DIAGNOSIS — R20.0 NUMBNESS AND TINGLING IN LEFT ARM: Primary | ICD-10-CM

## 2024-04-29 DIAGNOSIS — R73.9 HYPERGLYCEMIA: ICD-10-CM

## 2024-04-29 DIAGNOSIS — R20.2 NUMBNESS AND TINGLING IN LEFT ARM: Primary | ICD-10-CM

## 2024-04-29 DIAGNOSIS — Z13.6 CARDIOVASCULAR SCREENING; LDL GOAL LESS THAN 100: ICD-10-CM

## 2024-04-29 DIAGNOSIS — J32.0 CHRONIC MAXILLARY SINUSITIS: ICD-10-CM

## 2024-04-29 DIAGNOSIS — R79.9 ABNORMAL FINDING OF BLOOD CHEMISTRY, UNSPECIFIED: ICD-10-CM

## 2024-04-29 DIAGNOSIS — F41.9 ANXIETY AND DEPRESSION: ICD-10-CM

## 2024-04-29 PROCEDURE — 99215 OFFICE O/P EST HI 40 MIN: CPT | Performed by: FAMILY MEDICINE

## 2024-04-29 RX ORDER — FLUTICASONE PROPIONATE 50 MCG
2 SPRAY, SUSPENSION (ML) NASAL DAILY
Qty: 16 ML | Refills: 2 | Status: SHIPPED | OUTPATIENT
Start: 2024-04-29

## 2024-04-29 RX ORDER — ESCITALOPRAM OXALATE 20 MG/1
20 TABLET ORAL DAILY
Qty: 90 TABLET | Refills: 1 | Status: SHIPPED | OUTPATIENT
Start: 2024-04-29

## 2024-04-29 RX ORDER — RESPIRATORY SYNCYTIAL VIRUS VACCINE 120MCG/0.5
0.5 KIT INTRAMUSCULAR ONCE
Qty: 1 EACH | Refills: 0 | Status: CANCELLED | OUTPATIENT
Start: 2024-04-29 | End: 2024-04-29

## 2024-04-29 NOTE — PROGRESS NOTES
Assessment & Plan     Numbness and tingling in left arm  She is due for lab testing with her Medicare wellness visit but also will check due to numbness and tingling in the left arm.  Reassurance was provided related to the normal CT and carotid ultrasound.  She has plans to work with a  on muscle strengthening.  I did offer physical therapy but she will work with a  at this time.  - Basic metabolic panel  (Ca, Cl, CO2, Creat, Gluc, K, Na, BUN)  - TSH with free T4 reflex  - CBC with platelets and differential  - Ferritin    Chronic maxillary sinusitis  Incidental finding of maxillary sinusitis on CT imaging we will do a trial of fluticasone nasal spray 2 sprays to each nostril once daily.  - fluticasone (FLONASE) 50 MCG/ACT nasal spray  Dispense: 16 mL; Refill: 2    Hyperglycemia  Minimally elevated glucose in the past we will check basic metabolic panel and A1c to determine if elevated  - Basic metabolic panel  (Ca, Cl, CO2, Creat, Gluc, K, Na, BUN)  - Hemoglobin A1c    CARDIOVASCULAR SCREENING; LDL GOAL LESS THAN 100  Due for lipid testing  - Lipid panel reflex to direct LDL Fasting    Abnormal finding of blood chemistry, unspecified  Discussed sometimes low ferritin can contribute to tingling her symptoms sound likely positional and will be improved with strengthening of musculature  - Ferritin    Anxiety and depression  Refill provided  - escitalopram (LEXAPRO) 20 MG tablet  Dispense: 90 tablet; Refill: 1    She has a follow-up with me scheduled for her Medicare wellness visit.  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                  54 minutes spent on the date of the encounter  doing chart review, history, exam, diagnostics review, documentation, counseling and coordination of cares as noted.   Jorge Mercado MD   Britt Goldstein is a 63 year old, presenting for the following health issues:  Follow Up        2024     8:18 AM   Additional Questions   Roomed by MR     History of Present Illness       Reason for visit:  Left arm and leg tingly feeling      Left arm tingling, left leg tingling intermittently  Since last may has tingling on left arm and left leg.  Was in [place]  active. Bike ride notes some aching in the left arm . Playing pickle ball sometimes feels she has to shake out left arm.  She did have an evaluation which included CT of her head and carotid ultrasound which were negative see below. Left domininent hand  She meditates in the morning noted some pulsation in her left ear, saw an ENT in [place].    Some chronic nasal stuffiness noted chronic sinusitis on imaging  She has a family history of several cancers and draper's esophagus, father (90) & Paternal Aunt  from esophageal cancer.  She completed genetic evaluation which noted family hx risk but she did not have full report at time of visit.  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  I asked that she communicate with the previous genetic counselor for clarification.      BP Readings from Last 3 Encounters:   24 126/80   23 113/75   23 124/77    Wt Readings from Last 3 Encounters:   24 78.2 kg (172 lb 4.8 oz)   23 75.2 kg (165 lb 11.2 oz)   23 74 kg (163 lb 1.6 oz)                  Patient Active Problem List   Diagnosis    Family history of Draper's esophagus    Osteopenia of neck of femur, unspecified laterality    Postmenopausal status    Family history of renal cancer    Glaucoma  suspect of both eyes    Nuclear senile cataract of both eyes    Left tennis elbow    Vaginal dryness    Hyperlipidemia LDL goal <100    Anxiety and depression    Anisocoria    Testing of female for genetic disease carrier status    Calculus of gallbladder without cholecystitis without obstruction     Past Surgical History:   Procedure Laterality Date    APPENDECTOMY      BREAST SURGERY  biopsy    COLONOSCOPY N/A 2016    Procedure: COLONOSCOPY;  Surgeon: Gee Connelly MD;  Location:  GI    GYN SURGERY  ovarian cyst    [facility] NONSPECIFIC PROCEDURE  08-    excision nevi of neck & abdomen    [facility] NONSPECIFIC PROCEDURE  2000    removal & shave biopsy of cyst on back        Social History     Tobacco Use    Smoking status: Former     Current packs/day: 0.00     Average packs/day: 1 pack/day for 19.0 years (19.0 ttl pk-yrs)     Types: Cigarettes     Start date: 1976     Quit date: 1995     Years since quittin.7    Smokeless tobacco: Never    Tobacco comments:     Second hand smoke   Substance Use Topics    Alcohol use: Yes     Alcohol/week: 5.0 standard drinks of alcohol     Types: 5 Glasses of wine per week     Comment: 2 glasses if out     Family History   Problem Relation Age of Onset    Heart Failure Mother 82    Esophageal Cancer Father 90         in 2019    Sherwood's esophagus Father     Melanoma Father     Diabetes Type 2  Father     GERD Brother     Melanoma Brother     Kidney Cancer Brother     Lung Cancer Brother 69        smoker    Heart Disease Maternal Grandmother 80    Cerebrovascular Disease Maternal Grandfather 65    Pancreatic Cancer Paternal Grandmother 57    Prostate Cancer Paternal Grandfather 80    Esophageal Cancer Paternal Aunt 60    Glaucoma No family hx of     Macular Degeneration No family hx of          Current Outpatient Medications   Medication Sig Dispense Refill    desonide (DESOWEN) 0.05 % cream Apply topically 2 times daily      desonide  (DESOWEN) 0.05 % external ointment Apply topically 2 times daily 60 g 3    escitalopram (LEXAPRO) 20 MG tablet Take 1 tablet (20 mg) by mouth daily 90 tablet 1    fluticasone (FLONASE) 50 MCG/ACT nasal spray Spray 2 sprays into both nostrils daily 16 mL 2    ketoconazole (NIZORAL) 2 % external cream Apply topically daily      multivitamin w/minerals (MULTI-VITAMIN) tablet Take 1 tablet by mouth daily (Patient not taking: Reported on 9/11/2023)      valACYclovir (VALTREX) 1000 mg tablet Take 2 tablets (2,000 mg) by mouth 2 times daily for 1 day 4 tablet 0     Allergies   Allergen Reactions    Penicillins     Acetaminophen Other (See Comments) and Rash     Oral sores  Oral sores      Clindamycin Rash    Ibuprofen Rash     PN: LW Reaction: Rash, Generalize  PN: LW Reaction: Rash, Generalize      Latex Rash    Naproxen Rash    Nsaids Rash     Recent Labs   Lab Test 08/23/23  0703 05/26/23  0652   *  --    HDL 73  --    TRIG 77  --    ALT  --  11   CR  --  0.68   GFRESTIMATED  --  >90   POTASSIUM  --  4.1   TSH 3.24  --            Review of Systems  Problem list, PMH, Surgical HX, FH, SH, allergies, medications,immunizations reviewed and updated in Epic.  ROS  noted in HPI and ROS,staff / patient questionnaires reviewed by me.         Objective    /80 (BP Location: Right arm, Patient Position: Sitting, Cuff Size: Adult Regular)   Pulse 62   Temp 97.4  F (36.3  C)   Wt 78.2 kg (172 lb 4.8 oz)   SpO2 99%   BMI 26.20 kg/m    Body mass index is 26.2 kg/m .  Physical Exam   GENERAL: alert and no distress, no nasal quality to her voice  EYES: Eyes grossly normal to inspection, PERRL and conjunctivae and sclerae normal  HENT: ear canals and TM's normal,  mouth without ulcers or lesions  NECK: no adenopathy, no asymmetry, masses, or scars  RESP: lungs clear to auscultation - no rales, rhonchi or wheezes  CV: regular rate and rhythm, normal S1 S2, no S3 or S4, no murmur, click or rub, no peripheral edema, normal  radial pulse bilaterally.  ABDOMEN: soft, nontender, no hepatosplenomegaly, no masses and bowel sounds normal  MS: Full range of motion of neck with slight tightness of muscles noted, full range of motion of upper and lower extremities with full strength, no gross musculoskeletal defects noted, no edema  SKIN: no suspicious lesions or rashes  NEURO: Normal strength and tone, mentation intact and speech normal DTRs are 2+ biceps brachioradialis, patella and Achilles.  PSYCH: mentation appears normal, detailed, slightly anxious.      12/28/2023  BILATERAL CAROTID DOPPLER ULTRASOUND WITH SPECTRAL ANALYSIS    HISTORY:    Pulsatile tinnitus, left ear    COMPARISON:    None.    TECHNIQUE:    Diagnostic evaluation of the extracranial carotid and vertebral arteries was  performed using real-time gray-scale imaging along with Doppler, spectral  analysis (including waveform analysis), and color flow evaluation of these  vessels.    FINDINGS:    There is no significant carotid plaque formation. There is normal antegrade  direction of flow within the vertebral arteries bilaterally.    The following velocities were measured:    Right ICA peak systolic flow      71.4       cm/s  Right ICA end-diastolic flow      28.7       cm/s    Right ICA/CCA ratio                   1.1        Left ICA peak systolic flow        72.7       cm/s  Left ICA end-diastolic flow        24.7        cm/s  Left ICA/CCA ratio                     1.2          Internal carotid artery stenosis grading is performed using velocity and  velocity ratio parameters that are based on published and internally validated  NASCET criteria, which correlate the residual internal carotid lumen with the  distal internal carotid lumen as the denominator.  Procedure Note    Veronica Bucio MD - 12/28/2023  [facility] 4/08/2024  CT ANGIOGRAM OF THE HEAD WITH AND WITHOUT CONTRAST    HISTORY:    Pulsatile tinnitus, left  ear    COMPARISON:    None.    TECHNIQUE:    Thin axial CT sections through the foramen magnum to the vertex were generated  without IV contrast. Immediately following intravenous infusion of  80 mL IOHEXOL 350 MG IODINE/ML INTRAVENOUS SOLUTION, thin post IV contrast  images were obtained through the head.    Sagittal and coronal reconstructions were performed. Additional 3D  reconstruction image processing was performed on the [employer] system  workstation.    CTDIvol: 5.4 - 43.2 mGy. DLP: 1306 mGy-cm.    Automated dose reduction control was used for this exam.    ISTAT drawn onsite N/A    FINDINGS:    There is no occlusion or significant stenosis involving the intracranial  arterial circulation.  No aneurysm is detected. There is no aberrant course to  the internal carotid arteries, particularly on the left. There are no findings  of carotid artery dehiscence.    There are no findings of hemorrhage, acute infarction or intracranial mass  lesion.  There is no evidence for significant mass effect.  No abnormal  enhancement is seen.      No significant osseous or soft tissue abnormality is seen. There is chronic  sinus disease with mild mucoperiosteal thickening involving the bilateral  maxillary and sphenoid sinuses. There is partial opacification of the ethmoid  air cells.  Procedure Note    Loi Mcallister MD - 04/08/2024  Formatting of this note might be different from the original.    MRI ABDOMEN     CLINICAL HISTORY: Family history of renal cancer     TECHNIQUE:  Images were acquired with and without intravenous contrast  through the abdomen. The following MR images were acquired: TrueFISP,  multiplanar T2 weighted, axial T1 in/out of phase, axial fat-saturated  T1, diffusion-weighted. Multiplanar T1-weighted images with fat  saturation were before contrast administration and at multiple time  points following the administration of intravenous contrast.      Contrast dose: 7.4 mL Gadavist     Comparison study:  None     FINDINGS:     Liver: T2 hyperintense focus measuring 9 mm on series 12 image 19 in  the medial right hepatic lobe likely represents a hemangioma.  Noncirrhotic liver morphology.     Gallbladder: Cholelithiasis without evidence of cholecystitis.     Spleen: Normal.     Kidneys: Normal. No masses.     Adrenal glands: Normal.     Pancreas: Normal.     Bowel: Within normal limits.     Lymph nodes: No abnormally enlarged lymph nodes.     Blood vessels: Unremarkable.     Lung bases: Clear.     Bones and soft tissues: Within normal limits.     Mesentery and abdominal wall: Unremarkable.     Ascites: None.                                                                         IMPRESSION:    1. No suspicious masses in the abdomen.  2. 8 mm liver observation likely representing a hemangioma difficult  to fully characterize due to small size.  3. Cholelithiasis without evidence of cholecystitis.        TAMI VILLAFUERTE MD      Narrative & Impression   Brain MRI without and with contrast 8/28/2023 3:12 PM     Provided History: Nausea [R11.0 (ICD-10-CM)]; Pressure in head [R51.9  (ICD-10-CM)]; Numbness and tingling in left arm [R20.0, R20.2  (ICD-10-CM)].     Comparison: None.     Technique: Multiplanar, multisequence images of the brain without and  with intravenous contrast.      Contrast: 7.4 ml gadavist     Findings:  There is no mass effect, midline shift, or evidence of intracranial  hemorrhage. The ventricles are proportionate to the cerebral sulci.  Normal major vascular intracranial flow-voids.     Incidental right temporal lobe developmental venous anomaly.     No abnormality of the skull marrow signal. Retention cysts and mucosal  thickening in the left maxillary sinus. Mastoid air cells are clear.  The orbits are grossly unremarkable.                                                                      Impression:  1. No acute intracranial pathology.  2. No imaging finding to explain patient's symptoms.      I have personally reviewed the examination and initial interpretation  and I agree with the findings.     GENIE SUAREZ MD       I reviewed above     Signed Electronically by: Jorge Mercado MD

## 2024-05-01 ENCOUNTER — LAB (OUTPATIENT)
Dept: LAB | Facility: CLINIC | Age: 64
End: 2024-05-01
Payer: MEDICARE

## 2024-05-01 DIAGNOSIS — R20.0 NUMBNESS AND TINGLING IN LEFT ARM: ICD-10-CM

## 2024-05-01 DIAGNOSIS — R73.9 HYPERGLYCEMIA: ICD-10-CM

## 2024-05-01 DIAGNOSIS — R79.9 ABNORMAL FINDING OF BLOOD CHEMISTRY, UNSPECIFIED: ICD-10-CM

## 2024-05-01 DIAGNOSIS — R20.2 NUMBNESS AND TINGLING IN LEFT ARM: ICD-10-CM

## 2024-05-01 DIAGNOSIS — Z13.6 CARDIOVASCULAR SCREENING; LDL GOAL LESS THAN 100: ICD-10-CM

## 2024-05-01 LAB
ANION GAP SERPL CALCULATED.3IONS-SCNC: 9 MMOL/L (ref 7–15)
BASOPHILS # BLD AUTO: 0.1 10E3/UL (ref 0–0.2)
BASOPHILS NFR BLD AUTO: 1 %
BUN SERPL-MCNC: 13.5 MG/DL (ref 8–23)
CALCIUM SERPL-MCNC: 9.4 MG/DL (ref 8.8–10.2)
CHLORIDE SERPL-SCNC: 104 MMOL/L (ref 98–107)
CHOLEST SERPL-MCNC: 256 MG/DL
CREAT SERPL-MCNC: 0.79 MG/DL (ref 0.51–0.95)
DEPRECATED HCO3 PLAS-SCNC: 28 MMOL/L (ref 22–29)
EGFRCR SERPLBLD CKD-EPI 2021: 83 ML/MIN/1.73M2
EOSINOPHIL # BLD AUTO: 0.3 10E3/UL (ref 0–0.7)
EOSINOPHIL NFR BLD AUTO: 9 %
ERYTHROCYTE [DISTWIDTH] IN BLOOD BY AUTOMATED COUNT: 12 % (ref 10–15)
FASTING STATUS PATIENT QL REPORTED: ABNORMAL
FERRITIN SERPL-MCNC: 83 NG/ML (ref 11–328)
GLUCOSE SERPL-MCNC: 128 MG/DL (ref 70–99)
HBA1C MFR BLD: 5.4 %
HCT VFR BLD AUTO: 39.7 % (ref 35–47)
HDLC SERPL-MCNC: 75 MG/DL
HGB BLD-MCNC: 13.8 G/DL (ref 11.7–15.7)
IMM GRANULOCYTES # BLD: 0 10E3/UL
IMM GRANULOCYTES NFR BLD: 0 %
LDLC SERPL CALC-MCNC: 165 MG/DL
LYMPHOCYTES # BLD AUTO: 1.7 10E3/UL (ref 0.8–5.3)
LYMPHOCYTES NFR BLD AUTO: 42 %
MCH RBC QN AUTO: 32.3 PG (ref 26.5–33)
MCHC RBC AUTO-ENTMCNC: 34.8 G/DL (ref 31.5–36.5)
MCV RBC AUTO: 93 FL (ref 78–100)
MONOCYTES # BLD AUTO: 0.4 10E3/UL (ref 0–1.3)
MONOCYTES NFR BLD AUTO: 9 %
NEUTROPHILS # BLD AUTO: 1.5 10E3/UL (ref 1.6–8.3)
NEUTROPHILS NFR BLD AUTO: 39 %
NONHDLC SERPL-MCNC: 181 MG/DL
NRBC # BLD AUTO: 0 10E3/UL
NRBC BLD AUTO-RTO: 0 /100
PLATELET # BLD AUTO: 214 10E3/UL (ref 150–450)
POTASSIUM SERPL-SCNC: 3.9 MMOL/L (ref 3.4–5.3)
RBC # BLD AUTO: 4.27 10E6/UL (ref 3.8–5.2)
SODIUM SERPL-SCNC: 141 MMOL/L (ref 135–145)
TRIGL SERPL-MCNC: 82 MG/DL
TSH SERPL DL<=0.005 MIU/L-ACNC: 3.88 UIU/ML (ref 0.3–4.2)
WBC # BLD AUTO: 4 10E3/UL (ref 4–11)

## 2024-05-01 PROCEDURE — 99000 SPECIMEN HANDLING OFFICE-LAB: CPT | Performed by: PATHOLOGY

## 2024-05-01 PROCEDURE — 83036 HEMOGLOBIN GLYCOSYLATED A1C: CPT | Performed by: FAMILY MEDICINE

## 2024-05-01 PROCEDURE — 82728 ASSAY OF FERRITIN: CPT | Performed by: PATHOLOGY

## 2024-05-01 PROCEDURE — 80048 BASIC METABOLIC PNL TOTAL CA: CPT | Performed by: PATHOLOGY

## 2024-05-01 PROCEDURE — 36415 COLL VENOUS BLD VENIPUNCTURE: CPT | Performed by: PATHOLOGY

## 2024-05-01 PROCEDURE — 84443 ASSAY THYROID STIM HORMONE: CPT | Performed by: PATHOLOGY

## 2024-05-01 PROCEDURE — 80061 LIPID PANEL: CPT | Performed by: PATHOLOGY

## 2024-05-01 PROCEDURE — 85025 COMPLETE CBC W/AUTO DIFF WBC: CPT | Performed by: PATHOLOGY

## 2024-05-01 NOTE — RESULT ENCOUNTER NOTE
Your total cholesterol has increased to 256, LDL high at 165 and Good cholesterol is nicely high at 75. Your risk calculation is 5.4% goal is less than 7.5%.  At this time continue lifestyle modification exercise and lowering saturated fat in the diet. If you wish to start a statin medication for primary prevention I would recommend atorvastatin 20 mg or rosuvastatin 5 mg.   Your ferritin level was in normal range and your complete blood count normal likely not contributing to your numbness sensation.  Three month measure of glucose control A1C was normal 5.4 no diabetes, mild glucose elevation sometimes shows risk for impairment in glucose metabolism but at this time you do not need medication just lower simple sugars, avoid high fructose corn syrup food/ beverages.  Your thyroid, complete blood count including hemoglobin,white cell count for infection, kidney, liver, calcium, sodium and potassium were normal or within acceptable range.   Please make an appointment if questions to review.  Jorge Mercado MD

## 2024-05-28 SDOH — HEALTH STABILITY: PHYSICAL HEALTH: ON AVERAGE, HOW MANY MINUTES DO YOU ENGAGE IN EXERCISE AT THIS LEVEL?: 30 MIN

## 2024-05-28 SDOH — HEALTH STABILITY: PHYSICAL HEALTH: ON AVERAGE, HOW MANY DAYS PER WEEK DO YOU ENGAGE IN MODERATE TO STRENUOUS EXERCISE (LIKE A BRISK WALK)?: 6 DAYS

## 2024-05-28 ASSESSMENT — SOCIAL DETERMINANTS OF HEALTH (SDOH): HOW OFTEN DO YOU GET TOGETHER WITH FRIENDS OR RELATIVES?: THREE TIMES A WEEK

## 2024-05-28 NOTE — PROGRESS NOTES
Preventive Care Visit  [facility]  Jorge Mercado MD, Family Medicine  May 29, 2024  {Provider  Link to SmartSet :761830}    {PROVIDER CHARTING PREFERENCE:198557}    Britt Goldstein is a 63 year old, presenting for the following:  No chief complaint on file.        2024     6:54 AM   Additional Questions   Roomed by MR        Health Care Directive  Patient does not have a Health Care Directive or Living Will: {ADVANCE_DIRECTIVE_STATUS:980911}    HPI  ***  {MA/LPN/RN Pre-Provider Visit Orders- hCG/UA/Strep (Optional):976505}  {SUPERLIST (Optional):042733}  {additonal problems for provider to add (Optional):082538}       No data to display                   No data to display                   No data to display                      No data to display                   No data to display                   No data to display                     No data to display                   {Rooming Staff Patient needs a PHQ as part of the AWV.  Use this link to complete and then refresh the note to pull results Link to PHQ2 Assessment :500531}  {USE TO PULL IN PHQ RESULTS FOR TODAY:426191}       No data to display              Social History     Tobacco Use    Smoking status: Former     Current packs/day: 0.00     Average packs/day: 1 pack/day for 19.0 years (19.0 ttl pk-yrs)     Types: Cigarettes     Start date: 1976     Quit date: 1995     Years since quittin.8    Smokeless tobacco: Never    Tobacco comments:     Second hand smoke   Vaping Use    Vaping status: Never Used   Substance Use Topics    Alcohol use: Yes     Alcohol/week: 5.0 standard drinks of alcohol     Types: 5 Glasses of wine per week     Comment: 2 glasses if out    Drug use: Never     {Provider  If there are gaps in the social history shown above, please follow the link to update and then refresh the note Link to Social and Substance History :382482}     {Mammogram Decision Support  (Optional):790180}      History of abnormal Pap smear: { :656265}       ASCVD Risk   The 10-year ASCVD risk score (Thais SHAFER, et al., 2019) is: 5.4%    Values used to calculate the score:      Age: 63 years      Sex: Female      Is Non- : No      Diabetic: No      Tobacco smoker: No      Systolic Blood Pressure: 126 mmHg      Is BP treated: No      HDL Cholesterol: 75 mg/dL      Total Cholesterol: 256 mg/dL    {Link to Fracture Risk Assessment Tool (Optional):783439}    {Provider  Use the storyboard to review patient history, after sections have been marked as reviewed, refresh note to capture documentation:950806}  {Provider   REQUIRED AWV use this link to review and update sexual activity history  after section has been marked as reviewed, refresh note to capture documentation:083591}  Reviewed and updated as needed this visit by Provider                    {HISTORY OPTIONS (Optional):232777}  Current providers sharing in care for this patient include:  Patient Care Team:  Jorge Mercado MD as PCP - General (Family Practice)  Jt Andino MD as MD (Dermatology)  Aminata Zamora MD as MD (Ophthalmology)  Jorge Mercado MD as Assigned PCP    The following health maintenance items are reviewed in Epic and correct as of today:  Health Maintenance   Topic Date Due    ANNUAL REVIEW OF HM ORDERS  Never done    IPV IMMUNIZATION (2 of 3 - Adult catch-up series) 04/12/2001    RSV VACCINE (Pregnancy & 60+) (1 - 1-dose 60+ series) Never done    COVID-19 Vaccine (6 - 2023-24 season) 02/06/2024    MEDICARE ANNUAL WELLNESS VISIT  05/24/2024    FALL RISK ASSESSMENT  08/14/2024    LIPID  05/01/2025    MAMMO SCREENING  06/08/2025    ADVANCE CARE PLANNING  09/21/2025    COLORECTAL CANCER SCREENING  04/12/2026    GLUCOSE  05/01/2027    DTAP/TDAP/TD IMMUNIZATION (3 - Td or Tdap) 07/27/2028    DEXA  09/21/2038    HEPATITIS C SCREENING  Completed    PHQ-2 (once per calendar  "year)  Completed    INFLUENZA VACCINE  Completed    Pneumococcal Vaccine: 65+ Years  Completed    ZOSTER IMMUNIZATION  Completed    HPV IMMUNIZATION  Aged Out    MENINGITIS IMMUNIZATION  Aged Out    RSV MONOCLONAL ANTIBODY  Aged Out    HIV SCREENING  Discontinued    PAP  Discontinued       {ROS Picklists (Optional):702000}     Objective    Exam  There were no vitals taken for this visit.   Estimated body mass index is 26.2 kg/m  as calculated from the following:    Height as of 5/26/23: 1.727 m (5' 8\").    Weight as of 4/29/24: 78.2 kg (172 lb 4.8 oz).    Physical Exam  {Exam Choices (Optional):411169}  {Provider  The Mini-Cog is incomplete, use link to complete and refresh note Link to Mini-Cog :869661}       No data to display              {A Mini-Cog total score of 0-2 suggests the possibility of dementia, score of 3-5 suggests no dementia:039649}    Vision Screen     {Provider  Link to Vision and Hearing Results :529789}    Signed Electronically by: Jorge Mercado MD  {Email feedback regarding this note to [email or web address]   :794938}  "

## 2024-05-29 ENCOUNTER — TELEPHONE (OUTPATIENT)
Dept: FAMILY MEDICINE | Facility: CLINIC | Age: 64
End: 2024-05-29

## 2024-05-29 ENCOUNTER — OFFICE VISIT (OUTPATIENT)
Dept: FAMILY MEDICINE | Facility: CLINIC | Age: 64
End: 2024-05-29
Payer: MEDICARE

## 2024-05-29 VITALS
SYSTOLIC BLOOD PRESSURE: 119 MMHG | TEMPERATURE: 97.4 F | DIASTOLIC BLOOD PRESSURE: 74 MMHG | BODY MASS INDEX: 26.46 KG/M2 | OXYGEN SATURATION: 99 % | HEART RATE: 58 BPM | WEIGHT: 174 LBS

## 2024-05-29 DIAGNOSIS — H91.93 BILATERAL HEARING LOSS, UNSPECIFIED HEARING LOSS TYPE: ICD-10-CM

## 2024-05-29 DIAGNOSIS — Z00.00 ENCOUNTER FOR MEDICARE ANNUAL WELLNESS EXAM: Primary | ICD-10-CM

## 2024-05-29 DIAGNOSIS — Z13.71 TESTING OF FEMALE FOR GENETIC DISEASE CARRIER STATUS: ICD-10-CM

## 2024-05-29 DIAGNOSIS — Z12.31 SCREENING MAMMOGRAM FOR BREAST CANCER: ICD-10-CM

## 2024-05-29 DIAGNOSIS — R92.30 DENSE BREAST TISSUE: ICD-10-CM

## 2024-05-29 PROCEDURE — G0402 INITIAL PREVENTIVE EXAM: HCPCS | Performed by: FAMILY MEDICINE

## 2024-05-29 RX ORDER — RESPIRATORY SYNCYTIAL VIRUS VACCINE 120MCG/0.5
0.5 KIT INTRAMUSCULAR ONCE
Qty: 1 EACH | Refills: 0 | Status: CANCELLED | OUTPATIENT
Start: 2024-05-29 | End: 2024-05-29

## 2024-05-29 NOTE — PATIENT INSTRUCTIONS
"RSV vaccine through local pharmacy     Preventive Care Advice   This is general advice we often give to help people stay healthy. Your care team may have specific advice just for you. Please talk to your care team about your own preventive care needs.  Lifestyle  Exercise at least 150 minutes each week (30 minutes a day, 5 days a week).  Do muscle strengthening activities 2 days a week. These help control your weight and prevent disease.  No smoking.  Wear sunscreen to prevent skin cancer.  Have your home tested for radon every 2 to 5 years. Radon is a colorless, odorless gas that can harm your lungs. To learn more, go to www.health.[facility].[place].us and search for \"Radon in Homes.\"  Keep guns unloaded and locked up in a safe place like a safe or gun vault, or, use a gun lock and hide the keys. Always lock away bullets separately. To learn more, visit [employer].[place].gov and search for \"safe gun storage.\"  Nutrition  Eat 5 or more servings of fruits and vegetables each day.  Try wheat bread, brown rice and whole grain pasta (instead of white bread, rice, and pasta).  Get enough calcium and vitamin D. Check the label on foods and aim for 100% of the RDA (recommended daily allowance).  Regular exams  Have a dental exam and cleaning every 6 months.  See your health care team every year to talk about:  Any changes in your health.  Any medicines your care team has prescribed.  Preventive care, family planning, and ways to prevent chronic diseases.  Shots (vaccines)   HPV shots (up to age 26), if you've never had them before.  Hepatitis B shots (up to age 59), if you've never had them before.  COVID-19 shot: Get this shot when it's due.  Flu shot: Get a flu shot every year.  Tetanus shot: Get a tetanus shot every 10 years.  Pneumococcal, hepatitis A, and RSV shots: Ask your care team if you need these based on your risk.  Shingles shot (for age 50 and up).  General health tests  Diabetes screening:  Starting at age 35, Get screened for " diabetes at least every 3 years.  If you are younger than age 35, ask your care team if you should be screened for diabetes.  Cholesterol test: At age 39, start having a cholesterol test every 5 years, or more often if advised.  Bone density scan (DEXA): At age 50, ask your care team if you should have this scan for osteoporosis (brittle bones).  Hepatitis C: Get tested at least once in your life.  Abdominal aortic aneurysm screening: Talk to your doctor about having this screening if you:  Have ever smoked; and  Are biologically male; and  Are between the ages of 65 and 75.  STIs (sexually transmitted infections)  Before age 24: Ask your care team if you should be screened for STIs.  After age 24: Get screened for STIs if you're at risk. You are at risk for STIs (including HIV) if:  You are sexually active with more than one person.  You don't use condoms every time.  You or a partner was diagnosed with a sexually transmitted infection.  If you are at risk for HIV, ask about PrEP medicine to prevent HIV.  Get tested for HIV at least once in your life, whether you are at risk for HIV or not.  Cancer screening tests  Cervical cancer screening: If you have a cervix, begin getting regular cervical cancer screening tests at age 21. Most people who have regular screenings with normal results can stop after age 65. Talk about this with your provider.  Breast cancer scan (mammogram): If you've ever had breasts, begin having regular mammograms starting at age 40. This is a scan to check for breast cancer.  Colon cancer screening: It is important to start screening for colon cancer at age 45.  Have a colonoscopy test every 10 years (or more often if you're at risk) Or, ask your provider about stool tests like a FIT test every year or Cologuard test every 3 years.  To learn more about your testing options, visit: www.[employer]/278315.pdf.  For help making a decision, visit: [email or web address].  Prostate cancer screening test:  If you have a prostate and are age 55 to 69, ask your provider if you would benefit from a yearly prostate cancer screening test.  Lung cancer screening: If you are a current or former smoker age 50 to 80, ask your care team if ongoing lung cancer screenings are right for you.  For informational purposes only. Not to replace the advice of your health care provider. Copyright   2023 [facility]. All rights reserved. Clinically reviewed by the [employer] [facility]. [employer] [id number] - REV 04/24.    Hearing Loss: Care Instructions  Overview     Hearing loss is a sudden or slow decrease in how well you hear. It can range from slight to profound. Permanent hearing loss can occur with aging. It also can happen when you are exposed long-term to loud noise. Examples include listening to loud music, riding motorcycles, or being around other loud machines.  Hearing loss can affect your work and home life. It can make you feel lonely or depressed. You may feel that you have lost your independence. But hearing aids and other devices can help you hear better and feel connected to others.  Follow-up care is a key part of your treatment and safety. Be sure to make and go to all appointments, and call your doctor if you are having problems. It's also a good idea to know your test results and keep a list of the medicines you take.  How can you care for yourself at home?  Avoid loud noises whenever possible. This helps keep your hearing from getting worse.  Always wear hearing protection around loud noises.  Wear a hearing aid as directed.  A professional can help you pick a hearing aid that will work best for you.  You can also get hearing aids over the counter for mild to moderate hearing loss.  Have hearing tests as your doctor suggests. They can show whether your hearing has changed. Your hearing aid may need to be adjusted.  Use other devices as needed. These may include:  Telephone amplifiers and  "hearing aids that can connect to a television, stereo, radio, or microphone.  Devices that use lights or vibrations. These alert you to the doorbell, a ringing telephone, or a baby monitor.  Television closed-captioning. This shows the words at the bottom of the screen. Most new TVs can do this.  TTY (text telephone). This lets you type messages back and forth on the telephone instead of talking or listening. These devices are also called TDD. When messages are typed on the keyboard, they are sent over the phone line to a receiving TTY. The message is shown on a monitor.  Use text messaging, social media, and email if it is hard for you to communicate by telephone.  Try to learn a listening technique called speechreading. It is not lipreading. You pay attention to people's gestures, expressions, posture, and tone of voice. These clues can help you understand what a person is saying. Face the person you are talking to, and have them face you. Make sure the lighting is good. You need to see the other person's face clearly.  Think about counseling if you need help to adjust to your hearing loss.  When should you call for help?  Watch closely for changes in your health, and be sure to contact your doctor if:    You think your hearing is getting worse.     You have new symptoms, such as dizziness or nausea.   Where can you learn more?  Go to https://www.[employer].net/patiented  Enter R798 in the search box to learn more about \"Hearing Loss: Care Instructions.\"  Current as of: September 27, 2023               Content Version: 14.0    2810-9235 [employer].   Care instructions adapted under license by your healthcare professional. If you have questions about a medical condition or this instruction, always ask your healthcare professional. [employer] disclaims any warranty or liability for your use of this information.      Learning About Stress  What is stress?     Stress is your body's response " to a hard situation. Your body can have a physical, emotional, or mental response. Stress is a fact of life for most people, and it affects everyone differently. What causes stress for you may not be stressful for someone else.  A lot of things can cause stress. You may feel stress when you go on a job interview, take a test, or run a race. This kind of short-term stress is normal and even useful. It can help you if you need to work hard or react quickly. For example, stress can help you finish an important job on time.  Long-term stress is caused by ongoing stressful situations or events. Examples of long-term stress include long-term health problems, ongoing problems at work, or conflicts in your family. Long-term stress can harm your health.  How does stress affect your health?  When you are stressed, your body responds as though you are in danger. It makes hormones that speed up your heart, make you breathe faster, and give you a burst of energy. This is called the fight-or-flight stress response. If the stress is over quickly, your body goes back to normal and no harm is done.  But if stress happens too often or lasts too long, it can have bad effects. Long-term stress can make you more likely to get sick, and it can make symptoms of some diseases worse. If you tense up when you are stressed, you may develop neck, shoulder, or low back pain. Stress is linked to high blood pressure and heart disease.  Stress also harms your emotional health. It can make you simmons, tense, or depressed. Your relationships may suffer, and you may not do well at work or school.  What can you do to manage stress?  You can try these things to help manage stress:   Do something active. Exercise or activity can help reduce stress. Walking is a great way to get started. Even everyday activities such as housecleaning or yard work can help.  Try yoga or leo chi. These techniques combine exercise and meditation. You may need some training  at first to learn them.  Do something you enjoy. For example, listen to music or go to a movie. Practice your hobby or do volunteer work.  Meditate. This can help you relax, because you are not worrying about what happened before or what may happen in the future.  Do guided imagery. Imagine yourself in any setting that helps you feel calm. You can use online videos, books, or a teacher to guide you.  Do breathing exercises. For example:  From a standing position, bend forward from the waist with your knees slightly bent. Let your arms dangle close to the floor.  Breathe in slowly and deeply as you return to a standing position. Roll up slowly and lift your head last.  Hold your breath for just a few seconds in the standing position.  Breathe out slowly and bend forward from the waist.  Let your feelings out. Talk, laugh, cry, and express anger when you need to. Talking with supportive friends or family, a counselor, or a chris leader about your feelings is a healthy way to relieve stress. Avoid discussing your feelings with people who make you feel worse.  Write. It may help to write about things that are bothering you. This helps you find out how much stress you feel and what is causing it. When you know this, you can find better ways to cope.  What can you do to prevent stress?  You might try some of these things to help prevent stress:  Manage your time. This helps you find time to do the things you want and need to do.  Get enough sleep. Your body recovers from the stresses of the day while you are sleeping.  Get support. Your family, friends, and community can make a difference in how you experience stress.  Limit your news feed. Avoid or limit time on social media or news that may make you feel stressed.  Do something active. Exercise or activity can help reduce stress. Walking is a great way to get started.  Where can you learn more?  Go to https://www.healthwise.net/patiented  Enter N032 in the search box to  "learn more about \"Learning About Stress.\"  Current as of: October 24, 2023               Content Version: 14.0    1891-3220 [employer].   Care instructions adapted under license by your healthcare professional. If you have questions about a medical condition or this instruction, always ask your healthcare professional. [employer] disclaims any warranty or liability for your use of this information.      Substance Use Disorder: Care Instructions  Overview     You can improve your life and health by stopping your use of alcohol or drugs. When you don't drink or use drugs, you may feel and sleep better. You may get along better with your family, friends, and coworkers. There are medicines and programs that can help with substance use disorder.  How can you care for yourself at home?  Here are some ways to help you stay sober and prevent relapse.  If you have been given medicine to help keep you sober or reduce your cravings, be sure to take it exactly as prescribed.  Talk to your doctor about programs that can help you stop using drugs or drinking alcohol.  Do not keep alcohol or drugs in your home.  Plan ahead. Think about what you'll say if other people ask you to drink or use drugs. Try not to spend time with people who drink or use drugs.  Use the time and money spent on drinking or drugs to do something that's important to you.  Preventing a relapse  Have a plan to deal with relapse. Learn to recognize changes in your thinking that lead you to drink or use drugs. Get help before you start to drink or use drugs again.  Try to stay away from situations, friends, or places that may lead you to drink or use drugs.  If you feel the need to drink alcohol or use drugs again, seek help right away. Call a trusted friend or family member. Some people get support from organizations such as Narcotics Anonymous or [employer] or from treatment facilities.  If you relapse, get help as soon as you " can. Some people make a plan with another person that outlines what they want that person to do for them if they relapse. The plan usually includes how to handle the relapse and who to notify in case of relapse.  Don't give up. Remember that a relapse doesn't mean that you have failed. Use the experience to learn the triggers that lead you to drink or use drugs. Then quit again. Recovery is a lifelong process. Many people have several relapses before they are able to quit for good.  Follow-up care is a key part of your treatment and safety. Be sure to make and go to all appointments, and call your doctor if you are having problems. It's also a good idea to know your test results and keep a list of the medicines you take.  When should you call for help?   Call 911  anytime you think you may need emergency care. For example, call if you or someone else:    Has overdosed or has withdrawal signs. Be sure to tell the emergency workers that you are or someone else is using or trying to quit using drugs. Overdose or withdrawal signs may include:  Losing consciousness.  Seizure.  Seeing or hearing things that aren't there (hallucinations).     Is thinking or talking about suicide or harming others.   Where to get help 24 hours a day, 7 days a week   If you or someone you know talks about suicide, self-harm, a mental health crisis, a substance use crisis, or any other kind of emotional distress, get help right away. You can:    Call the Suicide and Crisis Lifeline at 988.     Call 7-377-144-TALK (1-932.819.2752).     Text HOME to 815648 to access the Crisis Text Line.   Consider saving these numbers in your phone.  Go to [employer].comline.org for more information or to chat online.  Call your doctor now or seek immediate medical care if:    You are having withdrawal symptoms. These may include nausea or vomiting, sweating, shakiness, and anxiety.   Watch closely for changes in your health, and be sure to contact your doctor if:     "You have a relapse.     You need more help or support to stop.   Where can you learn more?  Go to https://www.health"[employer].net/patiented  Enter H573 in the search box to learn more about \"Substance Use Disorder: Care Instructions.\"  Current as of: November 15, 2023               Content Version: 14.0    0519-2903 [employer].   Care instructions adapted under license by your healthcare professional. If you have questions about a medical condition or this instruction, always ask your healthcare professional. [employer] disclaims any warranty or liability for your use of this information.      "

## 2024-05-29 NOTE — TELEPHONE ENCOUNTER
Left Voicemail (1st Attempt) and Sent Mychart (1st Attempt) for the patient to call back and schedule the following:    Appointment type: mammogram and audiology referral  Provider: per PCP  Return date: any  Specialty phone number: mammo - [phone], audio - [phone]

## 2024-05-29 NOTE — PROGRESS NOTES
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  She had a baseline MRI last year prior to me knowing full genetic assessment.  I reviewed we do not need to do an annual MRI.  I reviewed signs to watch for related to renal cancer which might be painless blood in the urine she is not having any of the symptoms.  I reviewed leiomyomas are often benign uterine fibroids of no concern and she is not having any lower pelvic pain therefore we do not need to do pelvic ultrasound at this time.    Screening mammogram for breast cancer  Dense breast tissue  Due for mammogram  - MA Screen Bilateral w/Jaren        Bilateral hearing loss, unspecified hearing loss type  She appears to have some hearing impairment therefore audiology referral was placed.  - Adult Audiology  Referral              Counseling  Appropriate preventive services were discussed with this patient, including applicable screening as appropriate for fall prevention, nutrition, physical activity, Tobacco-use cessation, weight loss and cognition.  Checklist reviewing preventive services available has been given to the patient.  Reviewed patient's diet, addressing concerns and/or questions.   The patient was provided with written information regarding signs of hearing loss.     Information was provided in the after visit summary regarding preventive care.  She should have an annual wellness visit.  She should follow-up prior if any concerns about her health for evaluation and management.  I discussed RSV vaccine through local pharmacy and COVID influenza  vaccine each fall.      Return in about 1 year (around 2025) for Routine Visit.    Britt Goldstein is a 63 year old, presenting for the following:  Physical        2024     6:54 AM   Additional Questions   Roomed by MR        Health Care Directive  Patient does not have a Health Care Directive or Living Will: Patient states has Advance Directive and will bring in a copy to clinic.    MELISSA Valentine 63 presents for medicare wellness visit  She sees EYE Provider has self scheduled.  Hearing concerns tinnitis went to [place] had an audiology assessment was normal hearing there and feels difficult to hear may have wax.  She would like to have hearing assessment if there is no signs of cerumen.  She brought in a copy of her genetic screening which showed heterozygous carrier therefore does not require annual MRI as noted in previous note from 2023.  She did have baseline MRI 2023 which showed no concerns for renal cancer.  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  She asked if she should have a pelvic ultrasound previously ordered in 2023 but not completed and I reviewed due to heterozygous state and she has no symptoms of lower pelvic pain related to leiomyoma likely we do not need to obtain a pelvic ultrasound at this time.   FH: She has a family history of several cancers and draper's esophagus, father (90) & Paternal Aunt  from esophageal cancer.  She completed genetic evaluation which noted family hx risk but she is heterozygus therefore lower risk as noted above. Full document scanned medica 2023 and additional report to be scanned today.   Immunizations reviewed needs RSV through local pharmacy.  Immunization History   Administered Date(s) Administered    COVID-19 12+ () (Pfizer)  10/06/2023    COVID-19 Bivalent 12+ (Pfizer) 10/17/2022    COVID-19 MONOVALENT 12+ (Pfizer) 03/25/2021, 04/15/2021    COVID-19 Monovalent 12+ (Pfizer 2022) 05/31/2022    Flu, Unspecified 10/28/2015    HEPA 03/15/2001, 04/08/2002    Hepatitis A (ADULT 19+) 03/15/2001, 04/08/2002    Hepatitis B, Adult 06/16/2020, 09/21/2020    Influenza (IIV3) PF 12/24/2012, 10/16/2013, 10/07/2014    Influenza (intradermal) 10/28/2015    Influenza Vaccine 18-64 (Flublok) 10/17/2022    Influenza Vaccine 65+ (Fluzone HD) 09/11/2023    Influenza Vaccine >6 months,quad, PF 10/01/2016, 01/02/2020, 09/21/2020, 09/30/2021    Influenza Vaccine, 6+MO IM (QUADRIVALENT W/PRESERVATIVES) 10/28/2015    Pneumococcal 20 valent Conjugate (Prevnar 20) 08/14/2023    Poliovirus, inactivated (IPV) 03/15/2001    TDAP Vaccine (Adacel) 01/01/2008, 07/27/2018    Typhoid IM 03/15/2001, 01/02/2020    Typhus 03/15/2001    Zoster recombinant adjuvanted (SHINGRIX) 09/27/2019, 01/02/2020 5/28/2024   General Health   How would you rate your overall physical health? Good   Feel stress (tense, anxious, or unable to sleep) Rather much   (!) STRESS CONCERN      5/28/2024   Nutrition   Diet: Regular (no restrictions)         5/28/2024   Exercise   Days per week of moderate/strenous exercise 6 days   Average minutes spent exercising at this level 30 min         5/28/2024   Social Factors   Frequency of gathering with friends or relatives Three times a week   Worry food won't last until get money to buy more No   Food not last or not have enough money for food? No   Do you have housing?  Yes   Are you worried about losing your housing? No   Lack of transportation? No   Unable to get utilities (heat,electricity)? No         5/28/2024   Fall Risk   Fallen 2 or more times in the past year? No   Trouble with walking or balance? No          5/28/2024   Activities of Daily Living- Home Safety   Needs help with the following daily activites None of the above   Safety  concerns in the home None of the above         2024   Dental   Dentist two times every year? Yes         2024   Hearing Screening   Hearing concerns? (!) I FEEL THAT PEOPLE ARE MUMBLING OR NOT SPEAKING CLEARLY.    (!) I NEED TO ASK PEOPLE TO SPEAK UP OR REPEAT THEMSELVES.    (!) IT'S HARD TO FOLLOW A CONVERSATION IN A NOISY RESTAURANT OR CROWDED ROOM.    (!) TROUBLE FOLLOWING DIALOGUE IN THE THEATHER.    (!) TROUBLE UNDERSTANDING SOFT OR WHISPERED SPEECH.    (!) TROUBLE UNDERSTANDING SPEECH ON THE TELEPHONE         2024   Driving Risk Screening   Patient/family members have concerns about driving No         2024   General Alertness/Fatigue Screening   Have you been more tired than usual lately? No         2024   Urinary Incontinence Screening   Bothered by leaking urine in past 6 months No         2024   TB Screening   Were you born outside of the [place]? No         Today's PHQ-2 Score:       2024     4:57 PM   PHQ-2 (  Pfizer)   Q1: Little interest or pleasure in doing things 0   Q2: Feeling down, depressed or hopeless 0   PHQ-2 Score 0   Q1: Little interest or pleasure in doing things Not at all   Q2: Feeling down, depressed or hopeless Not at all   PHQ-2 Score 0           2024   Substance Use   Alcohol more than 3/day or more than 7/wk No   Do you have a current opioid prescription? No   How severe/bad is pain from 1 to 10? 0/10 (No Pain)   Do you use any other substances recreationally? (!) ALCOHOL     Social History     Tobacco Use    Smoking status: Former     Current packs/day: 0.00     Average packs/day: 1 pack/day for 19.0 years (19.0 ttl pk-yrs)     Types: Cigarettes     Start date: 1976     Quit date: 1995     Years since quittin.8    Smokeless tobacco: Never    Tobacco comments:     Second hand smoke   Vaping Use    Vaping status: Never Used   Substance Use Topics    Alcohol use: Yes     Alcohol/week: 5.0 standard drinks of alcohol     Types: 5  Glasses of wine per week     Comment: 2 glasses if out    Drug use: Never          Mammogram Screening - Mammogram every 1-2 years updated in Health Maintenance based on mutual decision making  HX dense breast tissue      History of abnormal Pap smear: No - age 65 or older with adequate negative prior screening test results (3 consecutive negative cytology results, 2 consecutive negative cotesting results, or 2 consecutive negative HrHPV test results within 10 years, with the most recent test occurring within the recommended screening interval for the test used)       ASCVD Risk   The 10-year ASCVD risk score (Thais SHAFER, et al., 2019) is: 4.8%    Values used to calculate the score:      Age: 63 years      Sex: Female      Is Non- : No      Diabetic: No      Tobacco smoker: No      Systolic Blood Pressure: 119 mmHg      Is BP treated: No      HDL Cholesterol: 75 mg/dL      Total Cholesterol: 256 mg/dL     Dexa 2023 low bone density      Reviewed and updated as needed this visit by Provider   Tobacco  Allergies  Meds  Problems  Med Hx  Surg Hx  Fam Hx            Labs reviewed in EPIC  BP Readings from Last 3 Encounters:   05/29/24 119/74   04/29/24 126/80   09/11/23 113/75    Wt Readings from Last 3 Encounters:   05/29/24 78.9 kg (174 lb)   04/29/24 78.2 kg (172 lb 4.8 oz)   09/11/23 75.2 kg (165 lb 11.2 oz)                  Patient Active Problem List   Diagnosis    Family history of Sherwood's esophagus    Osteopenia of neck of femur, unspecified laterality    Postmenopausal status    Family history of renal cancer    Glaucoma suspect of both eyes    Nuclear senile cataract of both eyes    Left tennis elbow    Vaginal dryness    Hyperlipidemia LDL goal <100    Anxiety and depression    Anisocoria    Testing of female for genetic disease carrier status    Calculus of gallbladder without cholecystitis without obstruction    Bilateral hearing loss, unspecified hearing loss type     Dense breast tissue     Past Surgical History:   Procedure Laterality Date    APPENDECTOMY      BREAST SURGERY  biopsy    COLONOSCOPY N/A 2016    Procedure: COLONOSCOPY;  Surgeon: Gee Connelly MD;  Location:  GI    GYN SURGERY  ovarian cyst    [facility] NONSPECIFIC PROCEDURE  08-    excision nevi of neck & abdomen    [facility] NONSPECIFIC PROCEDURE  2000    removal & shave biopsy of cyst on back        Social History     Tobacco Use    Smoking status: Former     Current packs/day: 0.00     Average packs/day: 1 pack/day for 19.0 years (19.0 ttl pk-yrs)     Types: Cigarettes     Start date: 1976     Quit date: 1995     Years since quittin.8    Smokeless tobacco: Never    Tobacco comments:     Second hand smoke   Substance Use Topics    Alcohol use: Yes     Alcohol/week: 5.0 standard drinks of alcohol     Types: 5 Glasses of wine per week     Comment: 2 glasses if out     Family History   Problem Relation Age of Onset    Heart Failure Mother 82    Hypertension Mother     Hyperlipidemia Mother     Esophageal Cancer Father 90         in 2019    Sherwood's esophagus Father     Melanoma Father     Diabetes Type 2  Father     Diabetes Father     Other Cancer Father     GERD Brother     Melanoma Brother     Kidney Cancer Brother     Lung Cancer Brother 69        smoker    Heart Disease Maternal Grandmother 80    Cerebrovascular Disease Maternal Grandfather 65    Pancreatic Cancer Paternal Grandmother 57    Prostate Cancer Paternal Grandfather 80    Esophageal Cancer Paternal Aunt 60    Glaucoma No family hx of     Macular Degeneration No family hx of          Current Outpatient Medications   Medication Sig Dispense Refill    desonide (DESOWEN) 0.05 % cream Apply topically 2 times daily      desonide (DESOWEN) 0.05 % external ointment Apply topically 2 times daily 60 g 3    escitalopram (LEXAPRO) 20 MG tablet Take 1 tablet (20 mg) by mouth daily 90 tablet 1    fluticasone (FLONASE)  50 MCG/ACT nasal spray Spray 2 sprays into both nostrils daily 16 mL 2    ketoconazole (NIZORAL) 2 % external cream Apply topically daily      multivitamin w/minerals (MULTI-VITAMIN) tablet Take 1 tablet by mouth daily (Patient not taking: Reported on 9/11/2023)      valACYclovir (VALTREX) 1000 mg tablet Take 2 tablets (2,000 mg) by mouth 2 times daily for 1 day 4 tablet 0     Allergies   Allergen Reactions    Penicillins     Acetaminophen Other (See Comments) and Rash     Oral sores  Oral sores      Clindamycin Rash    Ibuprofen Rash     PN: LW Reaction: Rash, Generalize  PN: LW Reaction: Rash, Generalize      Latex Rash    Naproxen Rash    Nsaids Rash     Recent Labs   Lab Test 05/01/24  0642 08/23/23  0703 05/26/23  0652   A1C 5.4  --   --    * 139*  --    HDL 75 73  --    TRIG 82 77  --    ALT  --   --  11   CR 0.79  --  0.68   GFRESTIMATED 83  --  >90   POTASSIUM 3.9  --  4.1   TSH 3.88 3.24  --       Current providers sharing in care for this patient include:  Patient Care Team:  Jorge Mercado MD as PCP - General (Family Practice)  Jt Andino MD as MD (Dermatology)  Aminata Zamora MD as MD (Ophthalmology)  Jorge Mercado MD as Assigned PCP    The following health maintenance items are reviewed in Epic and correct as of today:  Health Maintenance   Topic Date Due    ANNUAL REVIEW OF HM ORDERS  Never done    RSV VACCINE (Pregnancy & 60+) (1 - 1-dose 60+ series) Never done    COVID-19 Vaccine (6 - 2023-24 season) 02/06/2024    LIPID  05/01/2025    MEDICARE ANNUAL WELLNESS VISIT  05/29/2025    FALL RISK ASSESSMENT  05/29/2025    MAMMO SCREENING  06/08/2025    ADVANCE CARE PLANNING  09/21/2025    COLORECTAL CANCER SCREENING  04/12/2026    GLUCOSE  05/01/2027    DTAP/TDAP/TD IMMUNIZATION (3 - Td or Tdap) 07/27/2028    DEXA  09/21/2038    HEPATITIS C SCREENING  Completed    PHQ-2 (once per calendar year)  Completed    INFLUENZA VACCINE  Completed    Pneumococcal Vaccine: 65+  "Years  Completed    ZOSTER IMMUNIZATION  Completed    HPV IMMUNIZATION  Aged Out    MENINGITIS IMMUNIZATION  Aged Out    RSV MONOCLONAL ANTIBODY  Aged Out    HIV SCREENING  Discontinued    PAP  Discontinued    IPV IMMUNIZATION  Discontinued   The 10-year ASCVD risk score (Thais DK, et al., 2019) is: 4.8%    Values used to calculate the score:      Age: 63 years      Sex: Female      Is Non- : No      Diabetic: No      Tobacco smoker: No      Systolic Blood Pressure: 119 mmHg      Is BP treated: No      HDL Cholesterol: 75 mg/dL      Total Cholesterol: 256 mg/dL         Review of Systems  Problem list, PMH, Surgical HX, FH, SH, allergies, medications,immunizations reviewed and updated in Epic.  ROS noted in HPI and ROS,staff / patient questionnaires reviewed by me.        Objective    Exam  /74 (BP Location: Right arm, Patient Position: Sitting, Cuff Size: Adult Regular)   Pulse 58   Temp 97.4  F (36.3  C)   Wt 78.9 kg (174 lb)   SpO2 99%   BMI 26.46 kg/m     Estimated body mass index is 26.46 kg/m  as calculated from the following:    Height as of 5/26/23: 1.727 m (5' 8\").    Weight as of this encounter: 78.9 kg (174 lb).    Physical Exam  GENERAL: alert and no distress seems hard of hearing.  EYES: Eyes grossly normal to inspection, PERRL and conjunctivae and sclerae normal  HENT: ear canals and TM's normal, minimal non occluding cerumen, mouth without ulcers or lesions  NEURO: Normal strength and tone, mentation intact and speech normal moving all extremities.  PSYCH: mentation appears normal, affect detailed        5/29/2024   Mini Cog   Clock Draw Score 2 Normal   3 Item Recall 3 objects recalled   Mini Cog Total Score 5     Vision Screen  Reason Vision Screen Not Completed: Patient had exam in last 12 months      Signed Electronically by: Jorge Mercado MD    "

## 2024-05-31 ENCOUNTER — TELEPHONE (OUTPATIENT)
Dept: FAMILY MEDICINE | Facility: CLINIC | Age: 64
End: 2024-05-31
Payer: MEDICARE

## 2024-05-31 NOTE — TELEPHONE ENCOUNTER
Left Voicemail (2nd Attempt) for the patient to call back and schedule the following:    Appointment type: mammogram + audiology referral  Provider: per PCP  Return date: any  Specialty phone number: [phone], audio - [phone]

## 2024-06-21 ENCOUNTER — ANCILLARY PROCEDURE (OUTPATIENT)
Dept: MAMMOGRAPHY | Facility: CLINIC | Age: 64
End: 2024-06-21
Attending: FAMILY MEDICINE
Payer: MEDICARE

## 2024-06-21 DIAGNOSIS — R92.30 DENSE BREAST TISSUE: ICD-10-CM

## 2024-06-21 DIAGNOSIS — Z12.31 SCREENING MAMMOGRAM FOR BREAST CANCER: ICD-10-CM

## 2024-06-21 PROCEDURE — 77067 SCR MAMMO BI INCL CAD: CPT | Performed by: RADIOLOGY

## 2024-06-21 PROCEDURE — 77063 BREAST TOMOSYNTHESIS BI: CPT | Performed by: RADIOLOGY

## 2024-07-26 ENCOUNTER — TELEPHONE (OUTPATIENT)
Dept: AUDIOLOGY | Facility: CLINIC | Age: 64
End: 2024-07-26
Payer: MEDICARE

## 2024-07-26 NOTE — TELEPHONE ENCOUNTER
LVM with rescheduling needs and call back number to reschedule.     This patient needs to reschedule their HEV with [facility] on 9/6 to next available

## 2024-08-06 ENCOUNTER — TELEPHONE (OUTPATIENT)
Dept: AUDIOLOGY | Facility: CLINIC | Age: 64
End: 2024-08-06
Payer: MEDICARE

## 2024-10-03 ENCOUNTER — OFFICE VISIT (OUTPATIENT)
Dept: AUDIOLOGY | Facility: CLINIC | Age: 64
End: 2024-10-03
Payer: MEDICARE

## 2024-10-03 DIAGNOSIS — H93.12 TINNITUS OF LEFT EAR: Primary | ICD-10-CM

## 2024-10-03 PROCEDURE — 92567 TYMPANOMETRY: CPT | Performed by: AUDIOLOGIST

## 2024-10-03 PROCEDURE — 92557 COMPREHENSIVE HEARING TEST: CPT | Performed by: AUDIOLOGIST

## 2024-10-03 NOTE — PROGRESS NOTES
AUDIOLOGY REPORT    SUBJECTIVE: Angelita Valentine is a 64 year old female who was seen at the [facility] on 10/03/24 for audiologic evaluation, referred by, Jorge Mercado MD. The patient has not been seen previously in this clinic. The patient reports difficulty hearing in noisy situations such as in restaurants. She also reports sensitivity to loud sounds. The patient continues to experience occasional left pulsatile tinnitus (not currently experiencing), which was evaluated by an ENT in [place] last winter. She denies otalgia, otorrhea, aural fullness, dizziness, ear surgery, and noise exposure.       OBJECTIVE:  Abuse Screening:  Do you feel unsafe at home or work/school? No  Do you feel threatened by someone? No  Does anyone try to keep you from having contact with others, or doing things outside of your home? No  Physical signs of abuse present? No     Fall Risk Screen:  1. Have you fallen two or more times in the past year? No  2. Have you fallen and had an injury in the past year? No    Timed Up and Go Score (in seconds): Not tested  Is patient a fall risk?   Referral initiated: No  Fall Risk Assessment Completed by Audiology      Otoscopic exam indicates non-occluding cerumen bilaterally     Pure Tone Thresholds assessed using conventional audiometry with good reliability from 250-8000 Hz bilaterally using insert earphones and circumaural headphones.     RIGHT:  Normal hearing    LEFT:    Normal hearing    Tympanogram:    RIGHT: normal eardrum mobility with slight positive pressure    LEFT:   normal eardrum mobility with slight positive pressure    Reflexes (reported by stimulus ear): CNT/DNT due to inability to maintain a seal in either ear    Speech Reception Threshold:    RIGHT: 15 dB HL    LEFT:   15 dB HL    Word Recognition Score:     RIGHT: 100% at 55 dB HL using NU-6 recorded word list.    LEFT:   100% at 55 dB HL using NU-6 recorded word  list.      ASSESSMENT: Today's results reveal normal hearing bilaterally. Today s results were discussed with the patient in detail.     PLAN: It is recommended that Angelita return for repeat audiologic evaluation if new concerns arise. She is provided a communication tips handout to use as a resource. She may also schedule a follow-up appointment with an ENT physician regarding the pulsatile tinnitus for a second opinion if desired. The patient expressed agreement and understanding of the plan. Please call this clinic with questions regarding these results or recommendations.       Flo Loza, CCC-A  Clinical Audiologist  [place] #[id number]

## 2024-11-07 ENCOUNTER — TELEPHONE (OUTPATIENT)
Dept: FAMILY MEDICINE | Facility: CLINIC | Age: 64
End: 2024-11-07
Payer: MEDICARE

## 2024-11-07 NOTE — TELEPHONE ENCOUNTER
Left Voicemail (2nd Attempt) for the patient to call back and schedule the following:    Appointment type: RTN  Provider: Dr. Mercado  Return date: 5/28/25  Specialty phone number: [phone]  Additonal Notes: PCP long-term Oct 2024- pt to schedule Est NEW PCP visit- reschedule requested

## 2024-11-25 ENCOUNTER — MYC REFILL (OUTPATIENT)
Dept: FAMILY MEDICINE | Facility: CLINIC | Age: 64
End: 2024-11-25
Payer: MEDICARE

## 2024-11-25 DIAGNOSIS — F32.A ANXIETY AND DEPRESSION: ICD-10-CM

## 2024-11-25 DIAGNOSIS — F41.9 ANXIETY AND DEPRESSION: ICD-10-CM

## 2024-11-27 RX ORDER — ESCITALOPRAM OXALATE 20 MG/1
20 TABLET ORAL DAILY
Qty: 90 TABLET | Refills: 1 | OUTPATIENT
Start: 2024-11-27

## 2024-12-02 ENCOUNTER — MYC REFILL (OUTPATIENT)
Dept: FAMILY MEDICINE | Facility: CLINIC | Age: 64
End: 2024-12-02
Payer: MEDICARE

## 2024-12-02 DIAGNOSIS — F32.A ANXIETY AND DEPRESSION: ICD-10-CM

## 2024-12-02 DIAGNOSIS — F41.9 ANXIETY AND DEPRESSION: ICD-10-CM

## 2024-12-02 RX ORDER — ESCITALOPRAM OXALATE 20 MG/1
20 TABLET ORAL DAILY
Qty: 90 TABLET | Refills: 1 | Status: CANCELLED | OUTPATIENT
Start: 2024-12-02

## 2025-06-03 ENCOUNTER — ANCILLARY PROCEDURE (OUTPATIENT)
Dept: GENERAL RADIOLOGY | Facility: CLINIC | Age: 65
End: 2025-06-03
Payer: MEDICARE

## 2025-06-03 ENCOUNTER — LAB (OUTPATIENT)
Dept: LAB | Facility: CLINIC | Age: 65
End: 2025-06-03
Payer: MEDICARE

## 2025-06-03 ENCOUNTER — RESULTS FOLLOW-UP (OUTPATIENT)
Dept: INTERNAL MEDICINE | Facility: CLINIC | Age: 65
End: 2025-06-03

## 2025-06-03 ENCOUNTER — OFFICE VISIT (OUTPATIENT)
Dept: INTERNAL MEDICINE | Facility: CLINIC | Age: 65
End: 2025-06-03
Payer: MEDICARE

## 2025-06-03 VITALS
RESPIRATION RATE: 16 BRPM | HEART RATE: 75 BPM | SYSTOLIC BLOOD PRESSURE: 118 MMHG | BODY MASS INDEX: 26.95 KG/M2 | HEIGHT: 68 IN | OXYGEN SATURATION: 95 % | DIASTOLIC BLOOD PRESSURE: 76 MMHG | TEMPERATURE: 97 F | WEIGHT: 177.8 LBS

## 2025-06-03 DIAGNOSIS — R06.00 DYSPNEA, UNSPECIFIED TYPE: ICD-10-CM

## 2025-06-03 DIAGNOSIS — Z13.1 SCREENING FOR DIABETES MELLITUS: ICD-10-CM

## 2025-06-03 DIAGNOSIS — Z12.31 ENCOUNTER FOR SCREENING MAMMOGRAM FOR BREAST CANCER: ICD-10-CM

## 2025-06-03 DIAGNOSIS — R53.83 FATIGUE, UNSPECIFIED TYPE: ICD-10-CM

## 2025-06-03 DIAGNOSIS — F41.9 ANXIETY: ICD-10-CM

## 2025-06-03 DIAGNOSIS — Z13.0 SCREENING FOR DEFICIENCY ANEMIA: ICD-10-CM

## 2025-06-03 DIAGNOSIS — Z00.00 ENCOUNTER FOR MEDICARE ANNUAL WELLNESS EXAM: Primary | ICD-10-CM

## 2025-06-03 DIAGNOSIS — F41.9 ANXIETY AND DEPRESSION: ICD-10-CM

## 2025-06-03 DIAGNOSIS — E78.5 HYPERLIPIDEMIA LDL GOAL <100: ICD-10-CM

## 2025-06-03 DIAGNOSIS — F32.A ANXIETY AND DEPRESSION: ICD-10-CM

## 2025-06-03 DIAGNOSIS — Z00.00 ENCOUNTER FOR MEDICARE ANNUAL WELLNESS EXAM: ICD-10-CM

## 2025-06-03 DIAGNOSIS — E78.5 HYPERLIPIDEMIA LDL GOAL <100: Primary | ICD-10-CM

## 2025-06-03 LAB
ALBUMIN SERPL BCG-MCNC: 4.2 G/DL (ref 3.5–5.2)
ALP SERPL-CCNC: 88 U/L (ref 40–150)
ALT SERPL W P-5'-P-CCNC: 16 U/L (ref 0–50)
ANION GAP SERPL CALCULATED.3IONS-SCNC: 11 MMOL/L (ref 7–15)
AST SERPL W P-5'-P-CCNC: 22 U/L (ref 0–45)
BASOPHILS # BLD AUTO: 0 10E3/UL (ref 0–0.2)
BASOPHILS NFR BLD AUTO: 0 %
BILIRUB SERPL-MCNC: 0.5 MG/DL
BUN SERPL-MCNC: 9.6 MG/DL (ref 8–23)
CALCIUM SERPL-MCNC: 9.5 MG/DL (ref 8.8–10.4)
CHLORIDE SERPL-SCNC: 101 MMOL/L (ref 98–107)
CHOLEST SERPL-MCNC: 198 MG/DL
CREAT SERPL-MCNC: 0.71 MG/DL (ref 0.51–0.95)
EGFRCR SERPLBLD CKD-EPI 2021: >90 ML/MIN/1.73M2
EOSINOPHIL # BLD AUTO: 0.1 10E3/UL (ref 0–0.7)
EOSINOPHIL NFR BLD AUTO: 1 %
ERYTHROCYTE [DISTWIDTH] IN BLOOD BY AUTOMATED COUNT: 11.5 % (ref 10–15)
FASTING STATUS PATIENT QL REPORTED: NO
FASTING STATUS PATIENT QL REPORTED: NO
GLUCOSE SERPL-MCNC: 89 MG/DL (ref 70–99)
HCO3 SERPL-SCNC: 26 MMOL/L (ref 22–29)
HCT VFR BLD AUTO: 37.3 % (ref 35–47)
HDLC SERPL-MCNC: 59 MG/DL
HGB BLD-MCNC: 13.3 G/DL (ref 11.7–15.7)
IMM GRANULOCYTES # BLD: 0 10E3/UL
IMM GRANULOCYTES NFR BLD: 0 %
LDLC SERPL CALC-MCNC: 116 MG/DL
LYMPHOCYTES # BLD AUTO: 1.3 10E3/UL (ref 0.8–5.3)
LYMPHOCYTES NFR BLD AUTO: 18 %
MCH RBC QN AUTO: 32.8 PG (ref 26.5–33)
MCHC RBC AUTO-ENTMCNC: 35.7 G/DL (ref 31.5–36.5)
MCV RBC AUTO: 92 FL (ref 78–100)
MONOCYTES # BLD AUTO: 0.6 10E3/UL (ref 0–1.3)
MONOCYTES NFR BLD AUTO: 8 %
NEUTROPHILS # BLD AUTO: 5.6 10E3/UL (ref 1.6–8.3)
NEUTROPHILS NFR BLD AUTO: 73 %
NONHDLC SERPL-MCNC: 139 MG/DL
NRBC # BLD AUTO: 0 10E3/UL
NRBC BLD AUTO-RTO: 0 /100
PLATELET # BLD AUTO: 230 10E3/UL (ref 150–450)
POTASSIUM SERPL-SCNC: 4.1 MMOL/L (ref 3.4–5.3)
PROT SERPL-MCNC: 7.5 G/DL (ref 6.4–8.3)
RBC # BLD AUTO: 4.06 10E6/UL (ref 3.8–5.2)
SODIUM SERPL-SCNC: 138 MMOL/L (ref 135–145)
TRIGL SERPL-MCNC: 115 MG/DL
WBC # BLD AUTO: 7.6 10E3/UL (ref 4–11)

## 2025-06-03 PROCEDURE — 3074F SYST BP LT 130 MM HG: CPT

## 2025-06-03 PROCEDURE — 71046 X-RAY EXAM CHEST 2 VIEWS: CPT | Mod: GC | Performed by: RADIOLOGY

## 2025-06-03 PROCEDURE — 80061 LIPID PANEL: CPT | Performed by: PATHOLOGY

## 2025-06-03 PROCEDURE — G0439 PPPS, SUBSEQ VISIT: HCPCS

## 2025-06-03 PROCEDURE — 36415 COLL VENOUS BLD VENIPUNCTURE: CPT | Performed by: PATHOLOGY

## 2025-06-03 PROCEDURE — 85025 COMPLETE CBC W/AUTO DIFF WBC: CPT | Performed by: PATHOLOGY

## 2025-06-03 PROCEDURE — 3049F LDL-C 100-129 MG/DL: CPT

## 2025-06-03 PROCEDURE — 3078F DIAST BP <80 MM HG: CPT

## 2025-06-03 PROCEDURE — 80053 COMPREHEN METABOLIC PANEL: CPT | Performed by: PATHOLOGY

## 2025-06-03 PROCEDURE — 99213 OFFICE O/P EST LOW 20 MIN: CPT | Mod: 25

## 2025-06-03 RX ORDER — BUSPIRONE HYDROCHLORIDE 5 MG/1
5 TABLET ORAL 2 TIMES DAILY
Qty: 60 TABLET | Refills: 11 | Status: SHIPPED | OUTPATIENT
Start: 2025-06-03

## 2025-06-03 RX ORDER — ESCITALOPRAM OXALATE 20 MG/1
20 TABLET ORAL DAILY
Qty: 90 TABLET | Refills: 3 | Status: SHIPPED | OUTPATIENT
Start: 2025-06-03

## 2025-06-03 NOTE — PROGRESS NOTES
"  {PROVIDER CHARTING PREFERENCE:033965}    Subjective   Angelita is a 64 year old, presenting for the following health issues:  Establish Care (Coughing, fever, fatigue, shortness of breath./Symptoms started 10 days ago)      6/3/2025     1:14 PM   Additional Questions   Roomed by KTR     History of Present Illness       Reason for visit:  New patient, also sick with virus   She is taking medications regularly.        Angelita Valentine presents to the clinic today to re-establish care. She has a history of  hyperlipidemia (not on a statin), anxiety, and depression. Previously followed with Dr. Mercado who has retired.     She traveled to [place], came back on 5/16. Starting 5/19,  had viral symptoms, fever. She then developed symptoms about 10 days ago. She now has fever, chills, congestion. Will have temperature in the evening, . Few headaches, mild. Has been taking aspirin for fever or HA. Was having myalgia but that has resolved. Will have dyspnea with exertion such as going up stairs.    Feels like she always has allergies.     Anxiety - escitalopram has helped, feels like she startles easily. Can have \"anxiety eating.\" Reports gaining \"tons\" of weight. Sleeping has been going ok. Feels like noises can be loud and intense, worsened after covid. Continued struggles being in crowds.     Cataract surgery 8/11. 8/25    Mammogram 6/21/24  Colonoscopy 4/12/16; repeat due 2026  Dexa 9/21/23, low bone density; on a multivitamin.   Pap NILM, HPV (-) on 9/30/21 8/14/2023     9:57 AM 9/11/2023     3:43 PM   PHQ   PHQ-9 Total Score 7 5   Q9: Thoughts of better off dead/self-harm past 2 weeks Not at all Not at all        Review of Systems  Constitutional, HEENT, cardiovascular, pulmonary, gi and gu systems are negative, except as otherwise noted.      Objective    /76 (BP Location: Right arm, Patient Position: Sitting, Cuff Size: Adult Regular)   Pulse 75   Temp 97  F (36.1  C) (Temporal)   Resp 16   " "Ht 1.72 m (5' 7.72\")   Wt 80.6 kg (177 lb 12.8 oz)   SpO2 95%   BMI 27.26 kg/m    Body mass index is 27.26 kg/m .  Physical Exam   {Exam List (Optional):989524}    {Diagnostic Test Results (Optional):983018}        Signed Electronically by: Marissa Wood NP  {Email feedback regarding this note to [email or web address]   :[id number]}  " auscultation - no rales, rhonchi or wheezes  CV: regular rate and rhythm, normal S1 S2, no S3 or S4, no murmur, click or rub, no peripheral edema  PSYCH: mentation appears normal, affect normal/bright            Signed Electronically by: Marissa Wood NP

## 2025-06-03 NOTE — PATIENT INSTRUCTIONS
Patient Education   Preventive Care Advice   This is general advice given by our system to help you stay healthy. However, your care team may have specific advice just for you. Please talk to your care team about your preventive care needs.  Nutrition  Eat 5 or more servings of fruits and vegetables each day.  Try wheat bread, brown rice and whole grain pasta (instead of white bread, rice, and pasta).  Get enough calcium and vitamin D. Check the label on foods and aim for 100% of the RDA (recommended daily allowance).  Lifestyle  Exercise at least 150 minutes each week  (30 minutes a day, 5 days a week).  Do muscle strengthening activities 2 days a week. These help control your weight and prevent disease.  No smoking.  Wear sunscreen to prevent skin cancer.  Have a dental exam and cleaning every 6 months.  Yearly exams  See your health care team every year to talk about:  Any changes in your health.  Any medicines your care team has prescribed.  Preventive care, family planning, and ways to prevent chronic diseases.  Shots (vaccines)   HPV shots (up to age 26), if you've never had them before.  Hepatitis B shots (up to age 59), if you've never had them before.  COVID-19 shot: Get this shot when it's due.  Flu shot: Get a flu shot every year.  Tetanus shot: Get a tetanus shot every 10 years.  Pneumococcal, hepatitis A, and RSV shots: Ask your care team if you need these based on your risk.  Shingles shot (for age 50 and up)  General health tests  Diabetes screening:  Starting at age 35, Get screened for diabetes at least every 3 years.  If you are younger than age 35, ask your care team if you should be screened for diabetes.  Cholesterol test: At age 39, start having a cholesterol test every 5 years, or more often if advised.  Bone density scan (DEXA): At age 50, ask your care team if you should have this scan for osteoporosis (brittle bones).  Hepatitis C: Get tested at least once in your life.  STIs (sexually  transmitted infections)  Before age 24: Ask your care team if you should be screened for STIs.  After age 24: Get screened for STIs if you're at risk. You are at risk for STIs (including HIV) if:  You are sexually active with more than one person.  You don't use condoms every time.  You or a partner was diagnosed with a sexually transmitted infection.  If you are at risk for HIV, ask about PrEP medicine to prevent HIV.  Get tested for HIV at least once in your life, whether you are at risk for HIV or not.  Cancer screening tests  Cervical cancer screening: If you have a cervix, begin getting regular cervical cancer screening tests starting at age 21.  Breast cancer scan (mammogram): If you've ever had breasts, begin having regular mammograms starting at age 40. This is a scan to check for breast cancer.  Colon cancer screening: It is important to start screening for colon cancer at age 45.  Have a colonoscopy test every 10 years (or more often if you're at risk) Or, ask your provider about stool tests like a FIT test every year or Cologuard test every 3 years.  To learn more about your testing options, visit:   .  For help making a decision, visit:   https://bit.ly/xl56441.  Prostate cancer screening test: If you have a prostate, ask your care team if a prostate cancer screening test (PSA) at age 55 is right for you.  Lung cancer screening: If you are a current or former smoker ages 50 to 80, ask your care team if ongoing lung cancer screenings are right for you.  For informational purposes only. Not to replace the advice of your health care provider. Copyright   2023 [employer]. All rights reserved. Clinically reviewed by the [facility]. [employer] [id number] - REV 01/24.

## 2025-06-04 ENCOUNTER — PATIENT OUTREACH (OUTPATIENT)
Dept: CARE COORDINATION | Facility: CLINIC | Age: 65
End: 2025-06-04
Payer: MEDICARE

## 2025-06-22 ASSESSMENT — PATIENT HEALTH QUESTIONNAIRE - PHQ9
SUM OF ALL RESPONSES TO PHQ QUESTIONS 1-9: 1
10. IF YOU CHECKED OFF ANY PROBLEMS, HOW DIFFICULT HAVE THESE PROBLEMS MADE IT FOR YOU TO DO YOUR WORK, TAKE CARE OF THINGS AT HOME, OR GET ALONG WITH OTHER PEOPLE: NOT DIFFICULT AT ALL
SUM OF ALL RESPONSES TO PHQ QUESTIONS 1-9: 1

## 2025-06-23 ENCOUNTER — OFFICE VISIT (OUTPATIENT)
Dept: BEHAVIORAL HEALTH | Facility: CLINIC | Age: 65
End: 2025-06-23
Payer: MEDICARE

## 2025-06-23 ENCOUNTER — MYC MEDICAL ADVICE (OUTPATIENT)
Dept: INTERNAL MEDICINE | Facility: CLINIC | Age: 65
End: 2025-06-23

## 2025-06-23 DIAGNOSIS — F41.9 ANXIETY: ICD-10-CM

## 2025-06-23 DIAGNOSIS — N64.4 BREAST TENDERNESS: Primary | ICD-10-CM

## 2025-06-23 PROCEDURE — 90832 PSYTX W PT 30 MINUTES: CPT

## 2025-06-23 ASSESSMENT — COLUMBIA-SUICIDE SEVERITY RATING SCALE - C-SSRS
2. HAVE YOU ACTUALLY HAD ANY THOUGHTS OF KILLING YOURSELF?: NO
1. SINCE LAST CONTACT, HAVE YOU WISHED YOU WERE DEAD OR WISHED YOU COULD GO TO SLEEP AND NOT WAKE UP?: NO
ATTEMPT SINCE LAST CONTACT: NO
TOTAL  NUMBER OF INTERRUPTED ATTEMPTS SINCE LAST CONTACT: NO
SUICIDE, SINCE LAST CONTACT: NO
TOTAL  NUMBER OF ABORTED OR SELF INTERRUPTED ATTEMPTS SINCE LAST CONTACT: NO
6. HAVE YOU EVER DONE ANYTHING, STARTED TO DO ANYTHING, OR PREPARED TO DO ANYTHING TO END YOUR LIFE?: NO

## 2025-06-23 NOTE — PROGRESS NOTES
[facility] Primary Care: Integrated Behavioral Health    Integrated Behavioral Health   Mental Health & Addiction Services      Progress Note - Initial [facility] Visit     Patient Name: Angelita Valentine    Date: June 23, 2025  Service Type: Individual   Visit Start Time: 1:00 pm  Visit End Time:  1:31 pm   Attendees: Client   Service Modality: In-person     [facility] Visit Activities (Refresh list every visit): NEW and [facility] Only         DATA:     Interactive Complexity: No   Crisis: No     Assessments completed:     The following assessments were completed by patient for this visit:  PHQ2:   Phq2 (   1999 [employer],All Rights Reserved. Used With Permission. Developed By Melody Webb,Lisette Aguilar,[employer],With An Educational Felicitas From [employer].)    6/3/2025 11:45 AM CDT - Filed by Patient   The following questionnaire should only be answered by the patient. Are you the patient? Yes   Over the last 2 weeks, how often have you been bothered by any of the following problems?    Q1: Little interest or pleasure in doing things Not at all   Q2: Feeling down, depressed or hopeless Not at all   PHQ2 (   1999 [employer],ALL RIGHTS RESERVED. USED WITH PERMISSION. DEVELOPED BY MELODY WEBB,LISETTE AGUILAR,[employer]LEAGUES,WITH AN EDUCATIONAL FELICITAS FROM [employer].)    PHQ-2 Score (range: 0 - 6) 0       PHQ9:       8/14/2023     9:57 AM 9/11/2023     3:43 PM 6/22/2025     7:51 AM   PHQ-9 SCORE   PHQ-9 Total Score MyChart   1 (Minimal depression)   PHQ-9 Total Score 7 5 1        Patient-reported     GAD7:       8/14/2023     9:57 AM 9/11/2023     3:43 PM   RUSSELL-7 SCORE   Total Score 12 12     PROMIS 10-Global Health (all questions and answers displayed):       6/22/2025     7:53 AM   PROMIS 10   In general, would you say your health is: Good   In general, would you say your quality of life is: Good   In general, how would you rate your physical  health? Good   In general, how would you rate your mental health, including your mood and your ability to think? Fair   In general, how would you rate your satisfaction with your social activities and relationships? Fair   In general, please rate how well you carry out your usual social activities and roles Good   To what extent are you able to carry out your everyday physical activities such as walking, climbing stairs, carrying groceries, or moving a chair? Completely   In the past 7 days, how often have you been bothered by emotional problems such as feeling anxious, depressed, or irritable? Sometimes   In the past 7 days, how would you rate your fatigue on average? Mild   In the past 7 days, how would you rate your pain on average, where 0 means no pain, and 10 means worst imaginable pain? 0   In general, would you say your health is: 3   In general, would you say your quality of life is: 3   In general, how would you rate your physical health? 3   In general, how would you rate your mental health, including your mood and your ability to think? 2   In general, how would you rate your satisfaction with your social activities and relationships? 2   In general, please rate how well you carry out your usual social activities and roles. (This includes activities at home, at work and in your community, and responsibilities as a parent, child, spouse, employee, friend, etc.) 3   To what extent are you able to carry out your everyday physical activities such as walking, climbing stairs, carrying groceries, or moving a chair? 5   In the past 7 days, how often have you been bothered by emotional problems such as feeling anxious, depressed, or irritable? 3   In the past 7 days, how would you rate your fatigue on average? 2   In the past 7 days, how would you rate your pain on average, where 0 means no pain, and 10 means worst imaginable pain? 0   Global Mental Health Score 10    Global Physical Health Score 17    PROMIS  "TOTAL - SUBSCORES 27        Patient-reported     PROMIS 10-Global Health (only subscores and total score):       6/22/2025     7:53 AM   PROMIS-10 Scores Only   Global Mental Health Score 10    Global Physical Health Score 17    PROMIS TOTAL - SUBSCORES 27        Patient-reported     [place] Suicide Severity Rating Scale (Short Version)      5/26/2023     5:39 AM 6/23/2025     2:10 PM   [place] Suicide Severity Rating (Short Version)   Over the past 2 weeks have you felt down, depressed, or hopeless? no    Over the past 2 weeks have you had thoughts of killing yourself? no    Have you ever attempted to kill yourself? no    1. Wish to be Dead (Since Last Contact)  N   2. Non-Specific Active Suicidal Thoughts (Since Last Contact)  N   Actual Attempt (Since Last Contact)  N   Has subject engaged in non-suicidal self-injurious behavior? (Since Last Contact)  N   Interrupted Attempts (Since Last Contact)  N   Aborted or Self-Interrupted Attempt (Since Last Contact)  N   Preparatory Acts or Behavior (Since Last Contact)  N   Suicide (Since Last Contact)  N   Calculated C-SSRS Risk Score (Since Last Contact)  No Risk Indicated        Referral:   Patient was referred to [facility] by primary care provider.    Reason for referral: determine behavioral health treatment options.      [facility] introduced self and role. Discussed informed consent and limits to confidentiality.     Presenting Concerns/ Current Stressors:   Pt states her anxiety has worsened. She states she started Lexapro, which helped but has noticed an increase in noise sensitivity and being easily startled/jumpy even when at home. Pt also describes struggling to be in large crowds, making decisions, overeating when stressed, very anxious in new interactions, places or experiences and feels stress with tightness in chest, stomach, clenching fists.  Pt states she thinks \"I should want to go out more\" and when out of the house is often okay, but also is anxious to get back " home.   Pt states she is currently exercising and meditating regularly but is still needing more help managing anxiety.     Therapeutic Interventions:  Motivational Interviewing (MI): Validated patient's thoughts, feelings and experience. Expressed respect for patient's autonomy in decision making.  Asked open-ended questions to invite patient's self-reflection and self-direction around change and what is important for them in working towards their goals.  Expressed and demonstrated empathy through reflective listening.  Affirmed patient's strengths and abilities.    Response to treatment interventions:   Patient was receptive to interventions utilized.  Patient was engaged in the therapy process.      Safety Issues and Plan for Safety and Risk Management:     Patient denies a history of suicidal ideation, suicide attempts, self-injurious behavior, homicidal ideation, homicidal behavior, and and other safety concerns   Patient denies current fears or concerns for personal safety.   Patient denies current or recent suicidal ideation or behaviors.   Patient denies current or recent homicidal ideation or behaviors.   Patient denies current or recent self injurious behavior or ideation.   Patient denies other safety concerns.   Recommended that patient call 911 or go to the local ED should there be a change in any of these risk factors   Patient reports there are no firearms in the house.       ASSESSMENT:   Mental Status:     Appearance:   Appropriate    Eye Contact:   Good    Psychomotor Behavior: Normal    Attitude:   Cooperative  Interested   Orientation:   All   Speech Rate / Production: Normal/ Responsive   Volume:   Normal    Mood:    Normal   Affect:    Appropriate    Thought Content:  Clear    Thought Form:  Coherent  Goal Directed  Logical    Insight:    Good         Diagnostic Criteria:   Generalized Anxiety Disorder  A. Excessive anxiety and worry about a number of events or activities (such as work or  school performance).   B. The person finds it difficult to control the worry.  C. Select 3 or more symptoms (required for diagnosis). Only one item is required in children.   - Restlessness or feeling keyed up or on edge.    - Being easily fatigued.    - Difficulty concentrating or mind going blank.    - Irritability.    - Muscle tension.         DSM5 Diagnoses: (Sustained by DSM5 Criteria Listed Above)     Diagnoses: 300.00 (F41.9) Unspecified Anxiety Disorder     Psychosocial / Contextual Factors: Interpersonal Concerns       Collateral Reports Completed:   Routed note to PCP        PLAN: (Homework, other):     1. Patient was provided:  recommendation to schedule follow-up with [facility] recommendation to follow through on referrals     2. Provider recommended the following referrals: Pt has three week follow up with [facility], referral placed for outpatient therapy and resources for managing anxiety sent to my chart..        3. Suicide Risk and Safety Concerns were assessed for Angelita Valentine    Safety Plan:   Patient denied any current/recent/lifetime history of suicidal ideation and/or behaviors. Recommended that patient call 911 or go to the local ED should there be a change in any of these risk factors       KWESI Aguilar, [facility]   June 23, 2025   Answers submitted by the patient for this visit:  Patient Health Questionnaire (Submitted on 6/22/2025)  If you checked off any problems, how difficult have these problems made it for you to do your work, take care of things at home, or get along with other people?: Not difficult at all  PHQ9 TOTAL SCORE: 1

## 2025-06-24 ENCOUNTER — PATIENT OUTREACH (OUTPATIENT)
Dept: CARE COORDINATION | Facility: CLINIC | Age: 65
End: 2025-06-24
Payer: MEDICARE

## 2025-07-01 ENCOUNTER — ANCILLARY PROCEDURE (OUTPATIENT)
Dept: MAMMOGRAPHY | Facility: CLINIC | Age: 65
End: 2025-07-01
Payer: MEDICARE

## 2025-07-01 DIAGNOSIS — N64.4 BREAST TENDERNESS: ICD-10-CM

## 2025-07-01 DIAGNOSIS — R92.8 OTHER ABNORMAL AND INCONCLUSIVE FINDINGS ON DIAGNOSTIC IMAGING OF BREAST: ICD-10-CM

## 2025-07-01 PROCEDURE — 88305 TISSUE EXAM BY PATHOLOGIST: CPT | Mod: TC

## 2025-07-01 PROCEDURE — 77066 DX MAMMO INCL CAD BI: CPT | Mod: 76 | Performed by: RADIOLOGY

## 2025-07-01 PROCEDURE — 19083 BX BREAST 1ST LESION US IMAG: CPT | Mod: LT | Performed by: RADIOLOGY

## 2025-07-01 PROCEDURE — G0279 TOMOSYNTHESIS, MAMMO: HCPCS | Mod: 76 | Performed by: RADIOLOGY

## 2025-07-01 PROCEDURE — 77066 DX MAMMO INCL CAD BI: CPT | Performed by: RADIOLOGY

## 2025-07-01 PROCEDURE — 88305 TISSUE EXAM BY PATHOLOGIST: CPT | Mod: 26 | Performed by: STUDENT IN AN ORGANIZED HEALTH CARE EDUCATION/TRAINING PROGRAM

## 2025-07-01 PROCEDURE — G0279 TOMOSYNTHESIS, MAMMO: HCPCS | Performed by: RADIOLOGY

## 2025-07-01 PROCEDURE — 76642 ULTRASOUND BREAST LIMITED: CPT | Mod: LT | Performed by: RADIOLOGY

## 2025-07-01 RX ORDER — IOPAMIDOL 755 MG/ML
100 INJECTION, SOLUTION INTRAVASCULAR ONCE
Status: COMPLETED | OUTPATIENT
Start: 2025-07-01 | End: 2025-07-01

## 2025-07-01 RX ADMIN — IOPAMIDOL 97 ML: 755 INJECTION, SOLUTION INTRAVASCULAR at 10:44

## 2025-07-02 ENCOUNTER — TELEPHONE (OUTPATIENT)
Dept: MAMMOGRAPHY | Facility: CLINIC | Age: 65
End: 2025-07-02
Payer: MEDICARE

## 2025-07-02 LAB
PATH REPORT.COMMENTS IMP SPEC: NORMAL
PATH REPORT.COMMENTS IMP SPEC: NORMAL
PATH REPORT.FINAL DX SPEC: NORMAL
PATH REPORT.GROSS SPEC: NORMAL
PATH REPORT.MICROSCOPIC SPEC OTHER STN: NORMAL
PATH REPORT.RELEVANT HX SPEC: NORMAL
PHOTO IMAGE: NORMAL

## 2025-07-02 NOTE — NURSING NOTE
Spoke with patient regarding left breast biopsy results, which indicate negative for atypia or malignancy. Notified patient that the radiologist's recommendation is routine yearly mammography. Patient verbalized understanding of these results and all questions answered to her satisfaction.

## 2025-07-14 ENCOUNTER — VIRTUAL VISIT (OUTPATIENT)
Dept: BEHAVIORAL HEALTH | Facility: CLINIC | Age: 65
End: 2025-07-14
Payer: MEDICARE

## 2025-07-14 DIAGNOSIS — F41.9 ANXIETY: Primary | ICD-10-CM

## 2025-07-14 PROCEDURE — 90832 PSYTX W PT 30 MINUTES: CPT | Mod: 95

## 2025-07-14 NOTE — PROGRESS NOTES
[facility]: Integrated Behavioral Health    Behavioral Health Clinician Progress Note   Mental Health & Addiction Services      Monday July 14, 2025    Patient Name: Angelita Valentine       Service Type:  Individual   Service Location:  Face to Face in Clinic   Visit Start Time: 3:30p  Visit End Time:  3:56p   Session Length: 16 - 37    Attendees: Patient   Service Modality: Video Visit:      Provider verified identity through the following two step process.  Patient provided:  Patient is known previously to provider    Telemedicine Visit: The patient's condition can be safely assessed and treated via synchronous audio and visual telemedicine encounter.      Reason for Telemedicine Visit: Patient has requested telehealth visit    Originating Site (Patient Location): Patient's other home    Distant Site (Provider Location): [facility]    Consent:  The patient/guardian has verbally consented to: the potential risks and benefits of telemedicine (video visit) versus in person care; bill my insurance or make self-payment for services provided; and responsibility for payment of non-covered services.     Patient would like the video invitation sent by:  My Chart    Mode of Communication:  Video Conference [facility]    Distant Location (Provider):  On-site    As the provider I attest to compliance with applicable laws and regulations related to telemedicine.   Visit number: 2    [facility] Visit Activities (Refresh list every visit): NEW and [facility] Only     Date of Brief Diagnostic Assessment : pending  Treatment Plan Review Date: pending      DATA:    Extended Session (60+ minutes): No   Interactive Complexity: No   Crisis: No   [facility] Patient: No     Assessments completed:  The following assessments were completed by patient for this visit:  PHQ2:   Phq2 (   1999 [employer],All Rights Reserved. Used With Permission. Developed By Lisette Verduzco  Christofer Santiago And Colleagues,With An Educational Felicitas From [employer].)    6/3/2025 11:45 AM CDT - Filed by Patient   The following questionnaire should only be answered by the patient. Are you the patient? Yes   Over the last 2 weeks, how often have you been bothered by any of the following problems?    Q1: Little interest or pleasure in doing things Not at all   Q2: Feeling down, depressed or hopeless Not at all   PHQ2 (   1999 [employer],ALL RIGHTS RESERVED. USED WITH PERMISSION. DEVELOPED BY MELODY OJEDA,CHRISTOFER MANDEL AND COLLEAGUES,WITH AN EDUCATIONAL FELICITAS FROM [employer].)    PHQ-2 Score (range: 0 - 6) 0       PHQ9:       8/14/2023     9:57 AM 9/11/2023     3:43 PM 6/22/2025     7:51 AM   PHQ-9 SCORE   PHQ-9 Total Score MyChart   1 (Minimal depression)   PHQ-9 Total Score 7 5 1        Patient-reported     GAD2:       7/9/2025     1:01 PM   RUSSELL-2   Feeling nervous, anxious, or on edge 1   Not being able to stop or control worrying 1   RUSSELL-2 Total Score 2        Patient-reported     GAD7:       8/14/2023     9:57 AM 9/11/2023     3:43 PM   RUSSELL-7 SCORE   Total Score 12 12     CAGE-AID:        No data to display              PROMIS 10-Global Health (all questions and answers displayed):       6/22/2025     7:53 AM   PROMIS 10   In general, would you say your health is: Good   In general, would you say your quality of life is: Good   In general, how would you rate your physical health? Good   In general, how would you rate your mental health, including your mood and your ability to think? Fair   In general, how would you rate your satisfaction with your social activities and relationships? Fair   In general, please rate how well you carry out your usual social activities and roles Good   To what extent are you able to carry out your everyday physical activities such as walking, climbing stairs, carrying groceries, or moving a chair? Completely   In the past 7 days, how often  have you been bothered by emotional problems such as feeling anxious, depressed, or irritable? Sometimes   In the past 7 days, how would you rate your fatigue on average? Mild   In the past 7 days, how would you rate your pain on average, where 0 means no pain, and 10 means worst imaginable pain? 0   In general, would you say your health is: 3   In general, would you say your quality of life is: 3   In general, how would you rate your physical health? 3   In general, how would you rate your mental health, including your mood and your ability to think? 2   In general, how would you rate your satisfaction with your social activities and relationships? 2   In general, please rate how well you carry out your usual social activities and roles. (This includes activities at home, at work and in your community, and responsibilities as a parent, child, spouse, employee, friend, etc.) 3   To what extent are you able to carry out your everyday physical activities such as walking, climbing stairs, carrying groceries, or moving a chair? 5   In the past 7 days, how often have you been bothered by emotional problems such as feeling anxious, depressed, or irritable? 3   In the past 7 days, how would you rate your fatigue on average? 2   In the past 7 days, how would you rate your pain on average, where 0 means no pain, and 10 means worst imaginable pain? 0   Global Mental Health Score 10    Global Physical Health Score 17    PROMIS TOTAL - SUBSCORES 27        Patient-reported     PROMIS 10-Global Health (only subscores and total score):       6/22/2025     7:53 AM   PROMIS-10 Scores Only   Global Mental Health Score 10    Global Physical Health Score 17    PROMIS TOTAL - SUBSCORES 27        Patient-reported     [place] Suicide Severity Rating Scale (Lifetime/Recent)       No data to display              [place] Suicide Severity Rating Scale (Short Version)      5/26/2023     5:39 AM 6/23/2025     2:10 PM   [place] Suicide  "Severity Rating (Short Version)   Over the past 2 weeks have you felt down, depressed, or hopeless? no    Over the past 2 weeks have you had thoughts of killing yourself? no    Have you ever attempted to kill yourself? no    1. Wish to be Dead (Since Last Contact)  N   2. Non-Specific Active Suicidal Thoughts (Since Last Contact)  N   Actual Attempt (Since Last Contact)  N   Has subject engaged in non-suicidal self-injurious behavior? (Since Last Contact)  N   Interrupted Attempts (Since Last Contact)  N   Aborted or Self-Interrupted Attempt (Since Last Contact)  N   Preparatory Acts or Behavior (Since Last Contact)  N   Suicide (Since Last Contact)  N   Calculated C-SSRS Risk Score (Since Last Contact)  No Risk Indicated        Reason for Visit/Presenting Concern:  Anxiety    Current Stressors / Issues:   Pt states she continues to notice more of the same: \"the noise thing, so easily startled, jumping. Screamed when heard a fire crackers.  Always feel like I can't get to the bottom of my list. Body very tense afterwards.\"     Pt describes her anxiety triggers, anything new, changes from schedule, health, world and change. Always adverse to risk and change. \"I feel so inside myself. Don't like noise at home or loud restaurants. Feeling on edge with the not knowing and not being in control.\"     C and Pt discuss mindfulness and grounding after the triggering event, putting our negative or catastrophic thoughts on trial and window of tolerance.      Therapeutic Interventions:  Motivational Interviewing (MI): Validated patient's thoughts, feelings and experience. Expressed respect for patient's autonomy in decision making.  Asked open-ended questions to invite patient's self-reflection and self-direction around change and what is important for them in working towards their goals.  Expressed and demonstrated empathy through reflective listening.  Affirmed patient's strengths and abilities.  Cognitive Behavioral " Therapy (CBT): Provided psycho-education on cognitive distortions., Identified and challenged maladaptive patterns of behavior and thinking that interfere with patients life, Assisted patient in developing coping strategies (e.g. more physical exercise, less internal focus, increase social involvement, more assertiveness, etc.)., and Worked with patient to recognize irrational thought processes and identify more adaptive thoughts.    Mindfulness: Worked with patient to identify automatic thoughts and judgments that occur in response to thoughts and emotions.  Worked with patient to develop a plan for increased mindfulness outside of sessions.      Response to treatment interventions:   Patient was receptive to interventions utilized.  Patient made a plan for changes in the next week. Patient gained new insights into symptoms.     Progress on Treatment Objective(s) / Homework:   Minimal progress - PREPARATION (Decided to change - considering how); Intervened by negotiating a change plan and determining options / strategies for behavior change, identifying triggers, exploring social supports, and working towards setting a date to begin behavior change     Medication Review:   No changes to current psychiatric medication(s)     Medication Compliance:   NA     Chemical Use Review:  Substance Use: Chemical use reviewed, no active concerns identified      Tobacco Use: No current tobacco use.       Assessment: Current Emotional / Mental Status (status of significant symptoms):    Risk status (Self / Other harm or suicidal ideation)   Patient denies a history of suicidal ideation, suicide attempts, self-injurious behavior, homicidal ideation, homicidal behavior, and and other safety concerns   Patient denies current fears or concerns for personal safety.   Patient denies current or recent suicidal ideation or behaviors.   Patient denies current or recent homicidal ideation or behaviors.   Patient denies current or recent  self injurious behavior or ideation.   Patient denies other safety concerns.   Recommended that patient call 911 or go to the local ED should there be a change in any of these risk factors      ASSESSMENT:   Mental Status:     Appearance:   Appropriate    Eye Contact:   Good    Psychomotor Behavior: Normal    Attitude:   Cooperative    Orientation:   All   Speech Rate / Production: Normal/ Responsive Normal    Volume:   Normal    Mood:    Normal   Affect:    Appropriate    Thought Content:  Clear    Thought Form:  Coherent  Logical    Insight:    Good          Diagnoses:   300.00 (F41.9) Unspecified Anxiety Disorder     Collateral Reports Completed:   Not Applicable       Plan: (Homework, other):   Patient was provided No indications of CD issues  Patient was given information about behavioral services and encouraged to schedule a follow up appointment with the clinic [facility] in 3 weeks.    [facility] sent resources via my chart for challenging anxious thoughts and learning about window of tolerance.      KWESI Aguilar, [facility]

## 2025-08-04 ENCOUNTER — OFFICE VISIT (OUTPATIENT)
Dept: BEHAVIORAL HEALTH | Facility: CLINIC | Age: 65
End: 2025-08-04
Payer: MEDICARE

## 2025-08-04 DIAGNOSIS — F41.9 ANXIETY: Primary | ICD-10-CM

## 2025-08-04 PROCEDURE — 90832 PSYTX W PT 30 MINUTES: CPT

## 2025-08-04 ASSESSMENT — PATIENT HEALTH QUESTIONNAIRE - PHQ9
SUM OF ALL RESPONSES TO PHQ QUESTIONS 1-9: 6
10. IF YOU CHECKED OFF ANY PROBLEMS, HOW DIFFICULT HAVE THESE PROBLEMS MADE IT FOR YOU TO DO YOUR WORK, TAKE CARE OF THINGS AT HOME, OR GET ALONG WITH OTHER PEOPLE: SOMEWHAT DIFFICULT
SUM OF ALL RESPONSES TO PHQ QUESTIONS 1-9: 6

## 2025-08-05 ENCOUNTER — OFFICE VISIT (OUTPATIENT)
Dept: INTERNAL MEDICINE | Facility: CLINIC | Age: 65
End: 2025-08-05
Payer: MEDICARE

## 2025-08-05 VITALS
HEIGHT: 68 IN | SYSTOLIC BLOOD PRESSURE: 113 MMHG | DIASTOLIC BLOOD PRESSURE: 73 MMHG | RESPIRATION RATE: 16 BRPM | WEIGHT: 176.4 LBS | TEMPERATURE: 97.4 F | OXYGEN SATURATION: 96 % | BODY MASS INDEX: 26.73 KG/M2 | HEART RATE: 71 BPM

## 2025-08-05 DIAGNOSIS — Z01.818 PREOP GENERAL PHYSICAL EXAM: Primary | ICD-10-CM

## 2025-08-05 DIAGNOSIS — H25.13 NUCLEAR SENILE CATARACT OF BOTH EYES: ICD-10-CM

## 2025-08-05 PROCEDURE — 3074F SYST BP LT 130 MM HG: CPT

## 2025-08-05 PROCEDURE — 99214 OFFICE O/P EST MOD 30 MIN: CPT

## 2025-08-05 PROCEDURE — 3078F DIAST BP <80 MM HG: CPT

## 2025-08-18 ENCOUNTER — VIRTUAL VISIT (OUTPATIENT)
Dept: BEHAVIORAL HEALTH | Facility: CLINIC | Age: 65
End: 2025-08-18
Payer: MEDICARE

## 2025-08-18 DIAGNOSIS — F41.9 ANXIETY DISORDER, UNSPECIFIED TYPE: Primary | ICD-10-CM

## 2025-08-18 PROCEDURE — 90791 PSYCH DIAGNOSTIC EVALUATION: CPT | Mod: 52
